# Patient Record
Sex: MALE | Race: BLACK OR AFRICAN AMERICAN | Employment: UNEMPLOYED | URBAN - METROPOLITAN AREA
[De-identification: names, ages, dates, MRNs, and addresses within clinical notes are randomized per-mention and may not be internally consistent; named-entity substitution may affect disease eponyms.]

---

## 2017-01-02 ENCOUNTER — HOSPITAL ENCOUNTER (EMERGENCY)
Facility: CLINIC | Age: 52
Discharge: HOME | End: 2017-01-02
Attending: EMERGENCY MEDICINE | Admitting: EMERGENCY MEDICINE
Payer: COMMERCIAL

## 2017-01-02 VITALS
TEMPERATURE: 98.3 F | BODY MASS INDEX: 28.82 KG/M2 | HEART RATE: 90 BPM | WEIGHT: 156.6 LBS | SYSTOLIC BLOOD PRESSURE: 165 MMHG | RESPIRATION RATE: 16 BRPM | DIASTOLIC BLOOD PRESSURE: 108 MMHG | OXYGEN SATURATION: 98 % | HEIGHT: 62 IN

## 2017-01-02 DIAGNOSIS — R07.81 PLEURITIC CHEST PAIN: ICD-10-CM

## 2017-01-02 PROCEDURE — 93010 ELECTROCARDIOGRAM REPORT: CPT | Mod: Z6 | Performed by: EMERGENCY MEDICINE

## 2017-01-02 PROCEDURE — 25000132 ZZH RX MED GY IP 250 OP 250 PS 637: Performed by: EMERGENCY MEDICINE

## 2017-01-02 PROCEDURE — 99283 EMERGENCY DEPT VISIT LOW MDM: CPT | Mod: 25 | Performed by: EMERGENCY MEDICINE

## 2017-01-02 PROCEDURE — 93005 ELECTROCARDIOGRAM TRACING: CPT

## 2017-01-02 PROCEDURE — 99283 EMERGENCY DEPT VISIT LOW MDM: CPT

## 2017-01-02 RX ORDER — ACETAMINOPHEN 325 MG/1
975 TABLET ORAL ONCE
Status: COMPLETED | OUTPATIENT
Start: 2017-01-02 | End: 2017-01-02

## 2017-01-02 RX ORDER — IBUPROFEN 600 MG/1
600 TABLET, FILM COATED ORAL ONCE
Status: COMPLETED | OUTPATIENT
Start: 2017-01-02 | End: 2017-01-02

## 2017-01-02 RX ORDER — ACETAMINOPHEN 500 MG
1000 TABLET ORAL EVERY 6 HOURS PRN
Qty: 60 TABLET | Refills: 0 | Status: SHIPPED | OUTPATIENT
Start: 2017-01-02 | End: 2017-01-09

## 2017-01-02 RX ORDER — IBUPROFEN 600 MG/1
600 TABLET, FILM COATED ORAL EVERY 8 HOURS PRN
Qty: 20 TABLET | Refills: 0 | Status: ON HOLD | OUTPATIENT
Start: 2017-01-02 | End: 2020-11-24

## 2017-01-02 RX ADMIN — ACETAMINOPHEN 975 MG: 325 TABLET, FILM COATED ORAL at 17:31

## 2017-01-02 RX ADMIN — IBUPROFEN 600 MG: 600 TABLET ORAL at 17:31

## 2017-01-02 ASSESSMENT — ENCOUNTER SYMPTOMS
PALPITATIONS: 0
COUGH: 0
GASTROINTESTINAL NEGATIVE: 1
SHORTNESS OF BREATH: 0
BACK PAIN: 1
WEAKNESS: 1

## 2017-01-02 NOTE — ED PROVIDER NOTES
"  History     Chief Complaint   Patient presents with     Back Pain     Pt c/o upper (L) back pain and feeling weak     HPI  Rony Pandya is a 51 year old male who presents to the ED today for evaluation of left upper back pain and weakness since yesterday. Patient notes his pain is worse with deep inspiration but notes his breathing is otherwise normal. He denies cough. He denies pain or swelling in his lower extremities. He does not smoke. He denies a history of medical problems.    I have reviewed the Medications, Allergies, Past Medical and Surgical History, and Social History in the [facility] system.    PAST MEDICAL HISTORY: History reviewed. No pertinent past medical history.    PAST SURGICAL HISTORY: History reviewed. No pertinent past surgical history.    FAMILY HISTORY: No family history on file.    SOCIAL HISTORY:   Social History   Substance Use Topics     Smoking status: Never Smoker      Smokeless tobacco: Never Used     Alcohol Use: No       No current facility-administered medications for this encounter.     Current Outpatient Prescriptions   Medication     ibuprofen (ADVIL/MOTRIN) 600 MG tablet     acetaminophen (TYLENOL) 500 MG tablet      No Known Allergies      Review of Systems   Respiratory: Negative for cough and shortness of breath.    Cardiovascular: Negative for palpitations and leg swelling.   Gastrointestinal: Negative.    Musculoskeletal: Positive for back pain.   Neurological: Positive for weakness.   All other systems reviewed and are negative.      Physical Exam   BP: (!) 162/97 mmHg  Heart Rate: 102  Temp: 98.5  F (36.9  C)  Resp: 18  Height: 157 cm (5' 1.81\")  Weight: 71.033 kg (156 lb 9.6 oz)  SpO2: 98 %  Physical Exam   Constitutional: He is oriented to person, place, and time. He appears well-developed and well-nourished. No distress.   Cardiovascular: Normal rate, regular rhythm and normal heart sounds.    Pulmonary/Chest: Effort normal and breath sounds normal.   Neurological: He " is alert and oriented to person, place, and time.   Nursing note and vitals reviewed.      ED Course   Procedures       5:17 PM  The patient was seen and examined by Dr. Whitman in Room 17.     Medications   ibuprofen (ADVIL/MOTRIN) tablet 600 mg (600 mg Oral Given 1/2/17 1731)   acetaminophen (TYLENOL) tablet 975 mg (975 mg Oral Given 1/2/17 1731)              Labs Ordered and Resulted from Time of ED Arrival Up to the Time of Departure from the ED - No data to display    Assessments & Plan (with Medical Decision Making)   51-year-old male nonsmoker with acute nontraumatic upper pleuritic back discomfort.  No risk factors for pulmonary embolism no cough, I suspect he has mild pleurisy.  His lungs are clear and is not febrile and not hypoxic.  Recommend symptomatic management only at this time.  He should obtain a primary care provider as he is getting his care in the emergency Department we'll give him the number to the [facility].  I would not do any imaging or diagnostic testing at this time.  I have reviewed the nursing notes.    I have reviewed the findings, diagnosis, plan and need for follow up with the patient.    Discharge Medication List as of 1/2/2017  5:40 PM      START taking these medications    Details   ibuprofen (ADVIL/MOTRIN) 600 MG tablet Take 1 tablet (600 mg) by mouth every 8 hours as needed for moderate pain, Disp-20 tablet, R-0, Local Print      acetaminophen (TYLENOL) 500 MG tablet Take 2 tablets (1,000 mg) by mouth every 6 hours as needed for pain, Disp-60 tablet, R-0, Local Print             Final diagnoses:   Pleuritic chest pain   River BECERRIL , am serving as a trained medical scribe to document services personally performed by Oz Whitman MD, based on the provider's statements to me.   IOz MD, was physically present and have reviewed and verified the accuracy of this note documented by River Brenner.      1/2/2017   [facility], [place], EMERGENCY  [facility]      Oz Whitman MD  01/02/17 6572

## 2017-01-02 NOTE — DISCHARGE INSTRUCTIONS
Use medications as directed  Please make an appointment to follow up with [facility] (phone: [phone]) as soon as possible.

## 2017-01-02 NOTE — ED AVS SNAPSHOT
[facility], [place], Emergency Department    [address]    [facility] [address]    Phone:  [phone]    Fax:  [phone]                                       Rony Pandya   MRN: [id number]    Department:  [facility], Emergency Department   Date of Visit:  1/2/2017           After Visit Summary Signature Page     I have received my discharge instructions, and my questions have been answered. I have discussed any challenges I see with this plan with the nurse or doctor.    ..........................................................................................................................................  Patient/Patient Representative Signature      ..........................................................................................................................................  Patient Representative Print Name and Relationship to Patient    ..................................................               ................................................  Date                                            Time    ..........................................................................................................................................  Reviewed by Signature/Title    ...................................................              ..............................................  Date                                                            Time

## 2017-01-02 NOTE — ED AVS SNAPSHOT
[facility], Emergency Department    [address]    [place] [address]    Phone:  [phone]    Fax:  [phone]                                       Rony Pandya   MRN: [id number]    Department:  [facility], Emergency Department   Date of Visit:  1/2/2017           Patient Information     Date Of Birth          1965        Your diagnoses for this visit were:     Pleuritic chest pain        You were seen by Oz Whitman MD.        Discharge Instructions       Use medications as directed  Please make an appointment to follow up with [facility] (phone: [phone]) as soon as possible.    24 Hour Appointment Hotline       To make an appointment at any [facility], call [phone] ([phone]). If you don't have a family doctor or clinic, we will help you find one. [facility] clinics are conveniently located to serve the needs of you and your family.             Review of your medicines      START taking        Dose / Directions Last dose taken    acetaminophen 500 MG tablet   Commonly known as:  TYLENOL   Dose:  1000 mg   Quantity:  60 tablet        Take 2 tablets (1,000 mg) by mouth every 6 hours as needed for pain   Refills:  0        ibuprofen 600 MG tablet   Commonly known as:  ADVIL/MOTRIN   Dose:  600 mg   Quantity:  20 tablet        Take 1 tablet (600 mg) by mouth every 8 hours as needed for moderate pain   Refills:  0                Prescriptions were sent or printed at these locations (2 Prescriptions)                   Other Prescriptions                Printed at Department/Unit printer (2 of 2)         ibuprofen (ADVIL/MOTRIN) 600 MG tablet               acetaminophen (TYLENOL) 500 MG tablet                Procedures and tests performed during your visit     EKG 12 lead      Orders Needing Specimen Collection     None      Pending Results     No orders found from 1/1/2017 to 1/3/2017.            Thank you for choosing [facility]      "  Thank you for choosing [facility] for your care. Our goal is always to provide you with excellent care. Hearing back from our patients is one way we can continue to improve our services. Please take a few minutes to complete the written survey that you may receive in the mail after you visit with us. Thank you!        [facility] Information     [employer] lets you send messages to your doctor, view your test results, renew your prescriptions, schedule appointments and more. To sign up, go to [email or web address] . Click on \"Log in\" on the left side of the screen, which will take you to the Welcome page. Then click on \"Sign up Now\" on the right side of the page.     You will be asked to enter the access code listed below, as well as some personal information. Please follow the directions to create your username and password.     Your access code is: [email or web address]  Expires: 2017  5:32 PM     Your access code will  in 90 days. If you need help or a new code, please call your [facility] clinic or [phone].        After Visit Summary       This is your record. Keep this with you and show to your community pharmacist(s) and doctor(s) at your next visit.                  "

## 2017-01-03 LAB — INTERPRETATION ECG - MUSE: NORMAL

## 2017-09-18 ENCOUNTER — OFFICE VISIT (OUTPATIENT)
Dept: OPHTHALMOLOGY | Facility: CLINIC | Age: 52
End: 2017-09-18

## 2017-09-18 DIAGNOSIS — H52.4 PRESBYOPIA: ICD-10-CM

## 2017-09-18 DIAGNOSIS — H04.123 DRY EYE SYNDROME OF BILATERAL LACRIMAL GLANDS: Primary | ICD-10-CM

## 2017-09-18 ASSESSMENT — VISUAL ACUITY
OS_SC: J3
OD_SC: 20/20
METHOD: SNELLEN - LINEAR
OD_SC: J3
OS_SC+: +2
OS_SC: 20/25

## 2017-09-18 ASSESSMENT — CUP TO DISC RATIO
OS_RATIO: 0.4
OD_RATIO: 0.4

## 2017-09-18 ASSESSMENT — CONF VISUAL FIELD
OS_NORMAL: 1
OD_NORMAL: 1

## 2017-09-18 ASSESSMENT — EXTERNAL EXAM - LEFT EYE: OS_EXAM: NORMAL

## 2017-09-18 ASSESSMENT — TONOMETRY
OD_IOP_MMHG: 13
OS_IOP_MMHG: 13
IOP_METHOD: APPLANATION

## 2017-09-18 ASSESSMENT — REFRACTION_MANIFEST
OD_CYLINDER: SPHERE
OD_SPHERE: PLANO
OS_ADD: +1.75
OS_SPHERE: +0.25
OS_CYLINDER: SPHERE
OD_ADD: +1.75

## 2017-09-18 ASSESSMENT — SLIT LAMP EXAM - LIDS
COMMENTS: PINGUECULA
COMMENTS: PINGUECULA

## 2017-09-18 ASSESSMENT — EXTERNAL EXAM - RIGHT EYE: OD_EXAM: NORMAL

## 2017-09-18 NOTE — MR AVS SNAPSHOT
After Visit Summary   9/18/2017    Rony Pandya    MRN: [id number]           Patient Information     Date Of Birth          1965        Visit Information        Provider Department      9/18/2017 11:40 AM Alfredo Mohr, [facility]        Today's Diagnoses     Dry eye syndrome of bilateral lacrimal glands - Both Eyes    -  1    Presbyopia - Both Eyes           Follow-ups after your visit        Follow-up notes from your care team     Return in about 1 year (around 9/18/2018) for Comprehensive Exam.      Who to contact     Please call your clinic at [phone] to:    Ask questions about your health    Make or cancel appointments    Discuss your medicines    Learn about your test results    Speak to your doctor   If you have compliments or concerns about an experience at your clinic, or if you wish to file a complaint, please contact [facility] Patient Relations at [phone] or email us at [email or web address]         Additional Information About Your Visit        MyChart Information     [employer] gives you secure access to your electronic health record. If you see a primary care provider, you can also send messages to your care team and make appointments. If you have questions, please call your primary care clinic.  If you do not have a primary care provider, please call [phone] and they will assist you.      [employer] is an electronic gateway that provides easy, online access to your medical records. With [employer], you can request a clinic appointment, read your test results, renew a prescription or communicate with your care team.     To access your existing account, please contact your [facility] or call [phone] for assistance.        Care EveryWhere ID     This is your Care EveryWhere ID. This could be used by other organizations to access your [place] medical records  [id number]          Blood Pressure from Last 3 Encounters:   01/02/17 (!) 165/108   04/10/14 (!) 145/98   10/06/12 149/97    Weight from Last 3 Encounters:   01/02/17 71 kg (156 lb 9.6 oz)   10/06/12 74.4 kg (164 lb)   09/29/12 73.9 kg (163 lb)              We Performed the Following     Refraction        Primary Care Provider Office Phone # Fax #    [facility] [phone] [address] [facility] [address]        Equal Access to Services     [facility] : Hadii lisa reese hadasho Sobeny, waaxda luqadaha, qaybta kaalmada zac, andrew valente . So [facility] [phone].    ATENCIÓN: Si habla español, tiene a qureshi disposición servicios gratuitos de asistencia lingüística. [facility] [phone].    We comply with applicable federal civil rights laws and [place] laws. We do not discriminate on the basis of race, color, national origin, age, disability sex, sexual orientation or gender identity.            Thank you!     Thank you for choosing [facility]  for your care. Our goal is always to provide you with excellent care. Hearing back from our patients is one way we can continue to improve our services. Please take a few minutes to complete the written survey that you may receive in the mail after your visit with us. Thank you!             Your Updated Medication List - Protect others around you: Learn how to safely use, store and throw away your medicines at www.disposemymeds.org.          This list is accurate as of: 9/18/17 12:17 PM.  Always use your most recent med list.                   Brand Name Dispense Instructions for use Diagnosis    ibuprofen 600 MG tablet    ADVIL/MOTRIN    20 tablet    Take 1 tablet (600 mg) by mouth every 8 hours as needed for moderate pain

## 2017-09-18 NOTE — PROGRESS NOTES
Assessment/Plan  (H04.123) Dry eye syndrome of bilateral lacrimal glands - Both Eyes  (primary encounter diagnosis)  Comment: Contributing to redness OU  Plan: Discussed findings with patient, including importance of remembering to blink while working on electronic devices. Recommend regular lubrication with preservative free tears. Recommended patient start with 2-3x daily. Sample of Refresh PFAT's dispensed. Patient should RTC if redness persists or worsens.    (H52.4) Presbyopia - Both Eyes  Comment: Good best corrected vision  Plan: Discussed findings with patient. Rx for reading specs only dispensed. Patient happy with uncorrected vision, so bifocal does not appear indicated at this time. Will monitor annually for changes. Patient should call/RTC with any vision changes.       Complete documentation of historical and exam elements from today's encounter can  be found in the full encounter summary report (not reduplicated in this progress  note). I personally obtained the chief complaint(s) and history of present illness. I  confirmed and edited as necessary the review of systems, past medical/surgical  history, family history, social history, and examination findings as documented by  others; and I examined the patient myself. I personally reviewed the relevant tests,  images, and reports as documented above. I formulated and edited as necessary the  assessment and plan and discussed the findings and management plan with the  patient and family.    Alfredo Mohr OD

## 2018-02-28 ENCOUNTER — NURSE TRIAGE (OUTPATIENT)
Dept: NURSING | Facility: CLINIC | Age: 53
End: 2018-02-28

## 2018-02-28 ENCOUNTER — OFFICE VISIT (OUTPATIENT)
Dept: FAMILY MEDICINE | Facility: CLINIC | Age: 53
End: 2018-02-28

## 2018-02-28 VITALS
BODY MASS INDEX: 27.77 KG/M2 | TEMPERATURE: 98.8 F | SYSTOLIC BLOOD PRESSURE: 132 MMHG | WEIGHT: 150.9 LBS | OXYGEN SATURATION: 98 % | DIASTOLIC BLOOD PRESSURE: 80 MMHG | HEART RATE: 75 BPM

## 2018-02-28 DIAGNOSIS — R05.9 COUGH: Primary | ICD-10-CM

## 2018-02-28 DIAGNOSIS — R09.81 NASAL CONGESTION: ICD-10-CM

## 2018-02-28 LAB
FLUAV+FLUBV AG SPEC QL: NEGATIVE
FLUAV+FLUBV AG SPEC QL: NEGATIVE
SPECIMEN SOURCE: NORMAL

## 2018-02-28 PROCEDURE — 99213 OFFICE O/P EST LOW 20 MIN: CPT | Performed by: NURSE PRACTITIONER

## 2018-02-28 PROCEDURE — 87804 INFLUENZA ASSAY W/OPTIC: CPT | Performed by: NURSE PRACTITIONER

## 2018-02-28 RX ORDER — GUAIFENESIN 600 MG/1
600 TABLET, EXTENDED RELEASE ORAL 2 TIMES DAILY PRN
Qty: 20 TABLET | Refills: 0 | Status: ON HOLD | OUTPATIENT
Start: 2018-02-28 | End: 2020-11-24

## 2018-02-28 NOTE — PROGRESS NOTES
SUBJECTIVE:   Rony Pandya is a 52 year old male who presents to clinic today for the following health issues:    Acute Illness   Acute illness concerns: flu  Onset: Sunday     Fever: YES    Chills/Sweats: YES    Headache (location?): not right now because took aspirin     Sinus Pressure:no    Conjunctivitis:  no    Ear Pain: YES- a little     Rhinorrhea: YES    Congestion: no     Sore Throat: no      Cough: YES    Wheeze: no     Decreased Appetite: YES    Nausea: YES    Vomiting: no     Diarrhea:  no     Dysuria/Freq.: no     Fatigue/Achiness: YES    Sick/Strep Exposure: no      Therapies Tried and outcome: Ibuprofen     Drinking water and well hydrated       Problem list and histories reviewed & adjusted, as indicated.  Additional history: as documented    There is no problem list on file for this patient.    No past surgical history on file.    Social History   Substance Use Topics     Smoking status: Never Smoker     Smokeless tobacco: Never Used     Alcohol use No     No family history on file.      Current Outpatient Prescriptions   Medication Sig Dispense Refill     guaiFENesin (MUCINEX) 600 MG 12 hr tablet Take 1 tablet (600 mg) by mouth 2 times daily as needed for congestion 20 tablet 0     ibuprofen (ADVIL/MOTRIN) 600 MG tablet Take 1 tablet (600 mg) by mouth every 8 hours as needed for moderate pain 20 tablet 0     No Known Allergies  BP Readings from Last 3 Encounters:   02/28/18 132/80   01/02/17 (!) 165/108   04/10/14 (!) 145/98    Wt Readings from Last 3 Encounters:   02/28/18 150 lb 14.4 oz (68.4 kg)   01/02/17 156 lb 9.6 oz (71 kg)   10/06/12 164 lb (74.4 kg)                  Labs reviewed in EPIC    Reviewed and updated as needed this visit by clinical staff       Reviewed and updated as needed this visit by Provider         ROS:  Constitutional, HEENT, cardiovascular, pulmonary, gi and gu systems are negative, except as otherwise noted.    OBJECTIVE:     /80  Pulse 75  Temp 98.8  F  (37.1  C) (Oral)  Wt 150 lb 14.4 oz (68.4 kg)  SpO2 98%  BMI 27.77 kg/m2  Body mass index is 27.77 kg/(m^2).  GENERAL: mildly ill appearing, alert and in no distress  EYES: Eyes grossly normal to inspection, no discharge and PERRL  HENT: Normocephalic, ear canals- normal; TMs- normal ; No sinus tenderness  Nose- normal with clear rhinnorhea; oropharynx clear without erythema or exudates, Mouth- no ulcers, no lesions and mucous membranes moist  NECK: no tenderness, no adenopathy, no asymmetry, no masses, no stiffness  RESP: lungs clear to auscultation - no rales, no rhonchi, no wheezes  CV: regular rates and rhythm, normal S1 S2, no S3 or S4 and no murmur, no click or rub - peripheral pulses normal and brisk cap refill   ABDOMEN: soft, no tenderness, no hepatosplenomegaly, no masses, normal bowel sounds  MS: extremities- no gross deformities noted, no edema  SKIN: no suspicious lesions, no rashes, good skin turgor  NEURO: strength and tone- normal, sensory exam- grossly normal, mentation appears      Diagnostic Test Results:  Results for orders placed or performed in visit on 02/28/18 (from the past 24 hour(s))   Influenza A/B antigen   Result Value Ref Range    Influenza A/B Agn Specimen Nasopharyngeal     Influenza A Negative NEG^Negative    Influenza B Negative NEG^Negative       ASSESSMENT/PLAN:         ICD-10-CM    1. Cough R05 Influenza A/B antigen     guaiFENesin (MUCINEX) 600 MG 12 hr tablet   2. Nasal congestion R09.81 guaiFENesin (MUCINEX) 600 MG 12 hr tablet   Discussed viral versus bacterial illnesses along with typical course of progression, and no signs or symptoms of bacterial infection at this point.    Symptom management: gargle salt water, honey in tea or warm water, plenty of rest and extra fluids. Reviewed signs of dehydration. See patient instructions     See Patient Instructions    CRISTIAN Roth [facility]

## 2018-02-28 NOTE — LETTER
[facility]  [address] [place] [address]  [address]  [facility] [address]  Phone: [phone]  Fax: [phone]    February 28, 2018        Rony Pandya  [address]  [facility] [address]          To whom it may concern:    RE: Rony Pandya    Patient was seen and treated today at our clinic and missed work.    Please contact me for questions or concerns.      Sincerely,        CRISTIAN Roth CNP

## 2018-02-28 NOTE — TELEPHONE ENCOUNTER
"Patient calling reporting he is on day 4 of the \"flu.\" Body aches, fever, cough. Current temp is unknown patient does not have a thermometer.    Per guidelines advised to be seen with in 24 hours. Patient verbalized understanding.    Glenna Casper RN  [facility]      Reason for Disposition    Fever present > 3 days (72 hours)    Additional Information    Negative: Severe difficulty breathing (e.g., struggling for each breath, speaks in single words)    Negative: Bluish lips, tongue, or face now    Negative: Shock suspected (e.g., cold/pale/clammy skin, too weak to stand, low BP, rapid pulse)    Negative: Sounds like a life-threatening emergency to the triager    Negative: Severe sore throat    Negative: [1] Doesn't match the criteria for Influenza AND [2] sounds like a cold    Negative: Influenza vaccine reaction is suspected    Negative: Chest pain  (Exception: MILD central chest pain, present only when coughing)    Negative: [1] Headache AND [2] stiff neck (can't touch chin to chest)    Negative: Fever > 104 F (40 C)    Negative: [1] Difficulty breathing AND [2] not severe AND [3] not from stuffy nose (e.g., not relieved by cleaning out the nose)    Negative: Patient sounds very sick or weak to the triager    Negative: [1] Fever > 101 F (38.3 C) AND [2] age > 60    Negative: [1] Fever > 101 F (38.3 C) AND [2] bedridden (e.g., nursing home patient, CVA, chronic illness, recovering from surgery)    Negative: [1] Fever > 100.5 F (38.1 C) AND [2] diabetes mellitus or weak immune system (e.g., HIV positive, cancer chemo, splenectomy, organ transplant, chronic steroids)    Negative: Patient is HIGH RISK (e.g., age > 64 years, pregnant, HIV+, or chronic medical condition)    Protocols used: INFLUENZA - SEASONAL-ADULT-    "

## 2018-02-28 NOTE — MR AVS SNAPSHOT
After Visit Summary   2/28/2018    Rony Pandya    MRN: [id number]           Patient Information     Date Of Birth          1965        Visit Information        Provider Department      2/28/2018 1:40 PM Arlin Newman APRN [facility]        Today's Diagnoses     Cough    -  1    Nasal congestion          Care Instructions      Preventing Common Respiratory Infections  Respiratory infections such as colds and influenza ( the flu ) are common in winter. These infections are often caused by viruses. They may share some symptoms, but not all respiratory infections are the same. Some make you more sick than others. You can take steps to prevent common respiratory infections. And if you get sick, you can take care of yourself to keep the infection from getting worse.    What is a cold?    Symptoms include runny nose, coughing and sneezing, and sore throat. Cold symptoms tend to be milder than flu symptoms.    Symptoms tend to come on slowly. They last for a few days to about a week.    With a cold, you can still do most of the things you usually do.  What is the flu?    Symptoms include fever, headache, fatigue, cough, sore throat, runny nose, and muscle aches. Children may have upset stomach and vomiting, but adults usually don t.    Symptoms tend to come on quickly. Some, such as fatigue and cough, can last a few weeks.    With the flu, you may feel worn out and not able to do normal activities.    It s most likely NOT the flu if an adult has vomiting or diarrhea for a day or two. This so-called  stomach flu  is probably a GI (gastrointestinal) infection.  When the infection gets worse  Without proper care, a respiratory infection can get worse. It can lead to serious complications and death. If you aren t getting better, call your healthcare provider. Complications can include:    Bronchitis (infection of the airways that leads to shortness of breath and coughing up thick  yellow or green mucus)    Pneumonia (infection of the lungs in which fluid and mucus settle in the lungs, making breathing difficult)    Worsening of chronic conditions such as heart failure, chronic lung disease, asthma, or diabetes    Severe dehydration (loss of fluids)    Sinus problems    Ear infections   Get a flu vaccine  A flu vaccine protects you from influenza (but not other colds or infections). Get a vaccine each fall, before flu season starts. This can be done at a clinic, healthcare provider s office, drugstore, [facility] center, or through your workplace.  Get pneumococcal vaccines  Pneumonia can be a complication of influenza. There are 2 pneumococcal pneumonia vaccines that protect against many types of pneumonia. Talk with your healthcare provider about these important vaccines.   Keep germs from spreading  No one likes getting sick. To protect yourself and others from cold and flu germs:    Wash your hands often. Use alcohol-based hand  when you don t have access to soap and water.    Don t touch your eyes, nose, and mouth. This may help you keep germs out of your body.    Try to avoid people with respiratory infections. You may want to stay out of crowds during flu season (winter).    Ask your healthcare provider if you should get a pneumonia vaccination.  How to wash your hands    Use warm water and plenty of soap. Work up a good lather.    Clean your whole hand, under your nails, between your fingers, and up your wrists. Wash for at least 15 to 20 seconds. Don t just wipe--rub well.    Rinse. Let the water run down your fingertips, not up your wrists.    In a public restroom, use a paper towel to turn off the faucet and open the door.   Date Last Reviewed: 12/1/2016 2000-2017 [employer]. [address] [place] [address], [place] [address]. All rights reserved. This information is not intended as a substitute for professional medical care. Always follow your healthcare  professional's instructions.                Follow-ups after your visit        Who to contact     If you have questions or need follow up information about today's clinic visit or your schedule please contact [facility] directly at [phone].  Normal or non-critical lab and imaging results will be communicated to you by MyChart, letter or phone within 4 business days after the clinic has received the results. If you do not hear from us within 7 days, please contact the clinic through [employer] or phone. If you have a critical or abnormal lab result, we will notify you by phone as soon as possible.  Submit refill requests through [employer] or call your pharmacy and they will forward the refill request to us. Please allow 3 business days for your refill to be completed.          Additional Information About Your Visit        [employer] Information     [employer] gives you secure access to your electronic health record. If you see a primary care provider, you can also send messages to your care team and make appointments. If you have questions, please call your primary care clinic.  If you do not have a primary care provider, please call [phone] and they will assist you.        Care EveryWhere ID     This is your Care EveryWhere ID. This could be used by other organizations to access your [facility] medical records  [id number]        Your Vitals Were     Pulse Temperature Pulse Oximetry BMI (Body Mass Index)          75 98.8  F (37.1  C) (Oral) 98% 27.77 kg/m2         Blood Pressure from Last 3 Encounters:   02/28/18 132/80   01/02/17 (!) 165/108   04/10/14 (!) 145/98    Weight from Last 3 Encounters:   02/28/18 150 lb 14.4 oz (68.4 kg)   01/02/17 156 lb 9.6 oz (71 kg)   10/06/12 164 lb (74.4 kg)              We Performed the Following     Influenza A/B antigen          Today's Medication Changes          These changes are accurate as of 2/28/18  2:05 PM.  If you have any questions, ask your nurse or  doctor.               Start taking these medicines.        Dose/Directions    guaiFENesin 600 MG 12 hr tablet   Commonly known as:  MUCINEX   Used for:  Cough, Nasal congestion   Started by:  Arlin Newman APRN CNP        Dose:  600 mg   Take 1 tablet (600 mg) by mouth 2 times daily as needed for congestion   Quantity:  20 tablet   Refills:  0            Where to get your medicines      These medications were sent to [facility] [place], [place] - [address]  [address] [facility], [facility] [address]     Phone:  [phone]     guaiFENesin 600 MG 12 hr tablet                Primary Care Provider Office Phone # Fax #    [facility] [phone]       [address] [facility] [address]        Equal Access to Services     GRIS FRANCIS : Kath kellyo Sobeny, waaxda luqadaha, qaybta kaalmada adeegyada, andrew gaitan. So [facility] [phone].    ATENCIÓN: Si habla español, tiene a qureshi disposición servicios gratuitos de asistencia lingüística. [facility] [phone].    We comply with applicable federal civil rights laws and [place] laws. We do not discriminate on the basis of race, color, national origin, age, disability, sex, sexual orientation, or gender identity.            Thank you!     Thank you for choosing [facility]  for your care. Our goal is always to provide you with excellent care. Hearing back from our patients is one way we can continue to improve our services. Please take a few minutes to complete the written survey that you may receive in the mail after your visit with us. Thank you!             Your Updated Medication List - Protect others around you: Learn how to safely use, store and throw away your medicines at www.disposemymeds.org.          This list is accurate as of 2/28/18  2:05 PM.  Always use your most recent med list.                   Brand Name Dispense Instructions for use  Diagnosis    guaiFENesin 600 MG 12 hr tablet    MUCINEX    20 tablet    Take 1 tablet (600 mg) by mouth 2 times daily as needed for congestion    Cough, Nasal congestion       ibuprofen 600 MG tablet    ADVIL/MOTRIN    20 tablet    Take 1 tablet (600 mg) by mouth every 8 hours as needed for moderate pain

## 2018-02-28 NOTE — PATIENT INSTRUCTIONS
Preventing Common Respiratory Infections  Respiratory infections such as colds and influenza ( the flu ) are common in winter. These infections are often caused by viruses. They may share some symptoms, but not all respiratory infections are the same. Some make you more sick than others. You can take steps to prevent common respiratory infections. And if you get sick, you can take care of yourself to keep the infection from getting worse.    What is a cold?    Symptoms include runny nose, coughing and sneezing, and sore throat. Cold symptoms tend to be milder than flu symptoms.    Symptoms tend to come on slowly. They last for a few days to about a week.    With a cold, you can still do most of the things you usually do.  What is the flu?    Symptoms include fever, headache, fatigue, cough, sore throat, runny nose, and muscle aches. Children may have upset stomach and vomiting, but adults usually don t.    Symptoms tend to come on quickly. Some, such as fatigue and cough, can last a few weeks.    With the flu, you may feel worn out and not able to do normal activities.    It s most likely NOT the flu if an adult has vomiting or diarrhea for a day or two. This so-called  stomach flu  is probably a GI (gastrointestinal) infection.  When the infection gets worse  Without proper care, a respiratory infection can get worse. It can lead to serious complications and death. If you aren t getting better, call your healthcare provider. Complications can include:    Bronchitis (infection of the airways that leads to shortness of breath and coughing up thick yellow or green mucus)    Pneumonia (infection of the lungs in which fluid and mucus settle in the lungs, making breathing difficult)    Worsening of chronic conditions such as heart failure, chronic lung disease, asthma, or diabetes    Severe dehydration (loss of fluids)    Sinus problems    Ear infections   Get a flu vaccine  A flu vaccine protects you from influenza  (but not other colds or infections). Get a vaccine each fall, before flu season starts. This can be done at a clinic, healthcare provider s office, drugstore, senior center, or through your workplace.  Get pneumococcal vaccines  Pneumonia can be a complication of influenza. There are 2 pneumococcal pneumonia vaccines that protect against many types of pneumonia. Talk with your healthcare provider about these important vaccines.   Keep germs from spreading  No one likes getting sick. To protect yourself and others from cold and flu germs:    Wash your hands often. Use alcohol-based hand  when you don t have access to soap and water.    Don t touch your eyes, nose, and mouth. This may help you keep germs out of your body.    Try to avoid people with respiratory infections. You may want to stay out of crowds during flu season (winter).    Ask your healthcare provider if you should get a pneumonia vaccination.  How to wash your hands    Use warm water and plenty of soap. Work up a good lather.    Clean your whole hand, under your nails, between your fingers, and up your wrists. Wash for at least 15 to 20 seconds. Don t just wipe--rub well.    Rinse. Let the water run down your fingertips, not up your wrists.    In a public restroom, use a paper towel to turn off the faucet and open the door.   Date Last Reviewed: 12/1/2016 2000-2017 [employer]. [address] [facility], [place] [address]. All rights reserved. This information is not intended as a substitute for professional medical care. Always follow your healthcare professional's instructions.

## 2019-12-16 NOTE — PROGRESS NOTES
HPI:  Patient complains of mild redness when he is on the computer. He is on the computer 8 hours per day.       Pertinent Medical History:    Hypertension    Ocular History:    Dry Eye Syndrome both eyes.     Presbyopia both eyes.     Eye Medications:    None    Assessment and Plan:  1.   Presbyopia both eyes.     NVO. Polycarbonate    2.   Dry Eye Syndrome both eyes.     Start preservative free articial tears QID both eyes.     3.   Macular Lesion, right eye.     Differential diagnosis includes drusenoid pigmentary epithelial detachment, dry AMD, or pattern dystrophy.      OCT-A shows lesion in the choroid.     Macular lesion was not noted at the last exam with Dr. Mohr 09/18/17.     See retina for consult. Discovered on routine exam. Vision is 20/20 in both eyes.     4.   Cataract, both eyes.     Not visually significant. Monitor.       Medical History:  No past medical history on file.    Medications:  Current Outpatient Medications   Medication Sig Dispense Refill     guaiFENesin (MUCINEX) 600 MG 12 hr tablet Take 1 tablet (600 mg) by mouth 2 times daily as needed for congestion 20 tablet 0     ibuprofen (ADVIL/MOTRIN) 600 MG tablet Take 1 tablet (600 mg) by mouth every 8 hours as needed for moderate pain 20 tablet 0   Complete documentation of historical and exam elements from today's encounter can be found in the full encounter summary report (not reduplicated in this progress note). I personally obtained the chief complaint(s) and history of present illness.  I confirmed and edited as necessary the review of systems, past medical/surgical history, family history, social history, and examination findings as documented by others; and I examined the patient myself. I personally reviewed the relevant tests, images, and reports as documented above. I formulated and edited as necessary the assessment and plan and discussed the findings and management plan with the patient and family. - Kelvin Arellano OD

## 2019-12-18 ENCOUNTER — OFFICE VISIT (OUTPATIENT)
Dept: OPHTHALMOLOGY | Facility: CLINIC | Age: 54
End: 2019-12-18
Attending: OPTOMETRIST
Payer: COMMERCIAL

## 2019-12-18 DIAGNOSIS — H04.123 DRY EYE SYNDROME OF BOTH EYES: Primary | ICD-10-CM

## 2019-12-18 DIAGNOSIS — H35.721 RETINAL PIGMENT EPITHELIAL DETACHMENT OF RIGHT EYE: ICD-10-CM

## 2019-12-18 DIAGNOSIS — H52.4 PRESBYOPIA OF BOTH EYES: ICD-10-CM

## 2019-12-18 DIAGNOSIS — H25.13 NUCLEAR SENILE CATARACT OF BOTH EYES: ICD-10-CM

## 2019-12-18 PROCEDURE — 92250 FUNDUS PHOTOGRAPHY W/I&R: CPT | Mod: ZF | Performed by: OPTOMETRIST

## 2019-12-18 PROCEDURE — 92015 DETERMINE REFRACTIVE STATE: CPT | Mod: ZF

## 2019-12-18 PROCEDURE — G0463 HOSPITAL OUTPT CLINIC VISIT: HCPCS | Mod: ZF

## 2019-12-18 PROCEDURE — 92134 CPTRZ OPH DX IMG PST SGM RTA: CPT | Mod: ZF | Performed by: OPTOMETRIST

## 2019-12-18 RX ORDER — CARBOXYMETHYLCELLULOSE SODIUM 5 MG/ML
1 SOLUTION/ DROPS OPHTHALMIC 4 TIMES DAILY
Qty: 1 ML | Refills: 11 | Status: ON HOLD | OUTPATIENT
Start: 2019-12-18 | End: 2020-11-24

## 2019-12-18 ASSESSMENT — REFRACTION_MANIFEST
OS_AXIS: 065
OS_ADD: +2.00
OS_SPHERE: PLANO
OD_CYLINDER: +0.25
OS_CYLINDER: +0.50
OD_AXIS: 160
OD_SPHERE: PLANO
OD_ADD: +2.00

## 2019-12-18 ASSESSMENT — VISUAL ACUITY
METHOD: SNELLEN - LINEAR
OD_SC: 20/20
OS_SC: 20/20

## 2019-12-18 ASSESSMENT — TONOMETRY
OS_IOP_MMHG: 12
IOP_METHOD: TONOPEN
OD_IOP_MMHG: 08

## 2019-12-18 ASSESSMENT — CUP TO DISC RATIO
OD_RATIO: 0.4
OS_RATIO: 0.4

## 2019-12-18 ASSESSMENT — CONF VISUAL FIELD
OS_NORMAL: 1
OD_NORMAL: 1
METHOD: COUNTING FINGERS

## 2019-12-18 ASSESSMENT — EXTERNAL EXAM - RIGHT EYE: OD_EXAM: NORMAL

## 2019-12-18 ASSESSMENT — SLIT LAMP EXAM - LIDS
COMMENTS: NORMAL
COMMENTS: NORMAL

## 2019-12-18 ASSESSMENT — EXTERNAL EXAM - LEFT EYE: OS_EXAM: NORMAL

## 2019-12-18 NOTE — NURSING NOTE
Chief Complaints and History of Present Illnesses   Patient presents with     Yearly Exam     Chief Complaint(s) and History of Present Illness(es)     Yearly Exam     Laterality: both eyes    Onset: gradual    Onset: years ago    Quality: Feels that the va has changed at near    Frequency: constantly    Associated symptoms: Negative for dryness, redness and tearing    Pain scale: 0/10              Comments     Zakiya KAPOOR 10:04 AM December 18, 2019

## 2020-01-15 ENCOUNTER — TELEPHONE (OUTPATIENT)
Dept: OPHTHALMOLOGY | Facility: CLINIC | Age: 55
End: 2020-01-15

## 2020-01-15 NOTE — TELEPHONE ENCOUNTER
----- Message from Kelvin Arellano OD sent at 1/15/2020  2:55 PM CST -----  Drusenoid PED to see dr. villeda

## 2020-02-26 ENCOUNTER — TELEPHONE (OUTPATIENT)
Dept: OPHTHALMOLOGY | Facility: CLINIC | Age: 55
End: 2020-02-26

## 2020-02-26 NOTE — TELEPHONE ENCOUNTER
----- Message from Kelvin Arellano OD sent at 2/26/2020  1:53 PM CST -----  Drusenoid ped to follow up with dr. Cervantes.Thanks

## 2020-03-02 ENCOUNTER — HEALTH MAINTENANCE LETTER (OUTPATIENT)
Age: 55
End: 2020-03-02

## 2020-05-22 ENCOUNTER — VIRTUAL VISIT (OUTPATIENT)
Dept: FAMILY MEDICINE | Facility: CLINIC | Age: 55
End: 2020-05-22
Payer: COMMERCIAL

## 2020-05-22 DIAGNOSIS — Z53.9 NO SHOW: Primary | ICD-10-CM

## 2020-05-22 NOTE — PROGRESS NOTES
"Rony Pandya is a 54 year old male who is being evaluated via a billable video visit.      The patient has been notified of following:     \"This video visit will be conducted via a call between you and your physician/provider. We have found that certain health care needs can be provided without the need for an in-person physical exam.  This service lets us provide the care you need with a video conversation.  If a prescription is necessary we can send it directly to your pharmacy.  If lab work is needed we can place an order for that and you can then stop by our lab to have the test done at a later time.    Video visits are billed at different rates depending on your insurance coverage.  Please reach out to your insurance provider with any questions.    If during the course of the call the physician/provider feels a video visit is not appropriate, you will not be charged for this service.\"    Patient has given verbal consent for Video visit? Yes    How would you like to obtain your AVS? Donnell    Patient would like the video invitation sent by: Send to e-mail at: [email or web address]    Will anyone else be joining your video visit? Mom has appointment @ 9:40am with you, so they will be together.      Provider unable to reach by email or phone attempted x 3  This patient was a no show for this scheduled appointment.  "

## 2020-06-30 ENCOUNTER — HOSPITAL ENCOUNTER (EMERGENCY)
Facility: CLINIC | Age: 55
Discharge: HOME | End: 2020-06-30
Attending: EMERGENCY MEDICINE | Admitting: EMERGENCY MEDICINE
Payer: COMMERCIAL

## 2020-06-30 VITALS
RESPIRATION RATE: 18 BRPM | OXYGEN SATURATION: 99 % | TEMPERATURE: 98.6 F | HEART RATE: 83 BPM | SYSTOLIC BLOOD PRESSURE: 157 MMHG | DIASTOLIC BLOOD PRESSURE: 97 MMHG

## 2020-06-30 DIAGNOSIS — R10.13 ABDOMINAL PAIN, EPIGASTRIC: ICD-10-CM

## 2020-06-30 DIAGNOSIS — E87.1 HYPONATREMIA: ICD-10-CM

## 2020-06-30 DIAGNOSIS — R11.0 NAUSEA: ICD-10-CM

## 2020-06-30 LAB
ALBUMIN SERPL-MCNC: 3.7 G/DL (ref 3.4–5)
ALP SERPL-CCNC: 93 U/L (ref 40–150)
ALT SERPL W P-5'-P-CCNC: 16 U/L (ref 0–70)
ANION GAP SERPL CALCULATED.3IONS-SCNC: 9 MMOL/L (ref 3–14)
AST SERPL W P-5'-P-CCNC: 20 U/L (ref 0–45)
BASOPHILS # BLD AUTO: 0.1 10E9/L (ref 0–0.2)
BASOPHILS NFR BLD AUTO: 0.7 %
BILIRUB SERPL-MCNC: 0.8 MG/DL (ref 0.2–1.3)
BUN SERPL-MCNC: 3 MG/DL (ref 7–30)
CALCIUM SERPL-MCNC: 8.6 MG/DL (ref 8.5–10.1)
CHLORIDE SERPL-SCNC: 89 MMOL/L (ref 94–109)
CO2 SERPL-SCNC: 25 MMOL/L (ref 20–32)
CREAT SERPL-MCNC: 0.59 MG/DL (ref 0.66–1.25)
DIFFERENTIAL METHOD BLD: ABNORMAL
EOSINOPHIL # BLD AUTO: 0 10E9/L (ref 0–0.7)
EOSINOPHIL NFR BLD AUTO: 0.4 %
ERYTHROCYTE [DISTWIDTH] IN BLOOD BY AUTOMATED COUNT: 11.5 % (ref 10–15)
GFR SERPL CREATININE-BSD FRML MDRD: >90 ML/MIN/{1.73_M2}
GLUCOSE SERPL-MCNC: 109 MG/DL (ref 70–99)
HCT VFR BLD AUTO: 34.7 % (ref 40–53)
HGB BLD-MCNC: 12.2 G/DL (ref 13.3–17.7)
IMM GRANULOCYTES # BLD: 0 10E9/L (ref 0–0.4)
IMM GRANULOCYTES NFR BLD: 0.3 %
LIPASE SERPL-CCNC: 49 U/L (ref 73–393)
LYMPHOCYTES # BLD AUTO: 1 10E9/L (ref 0.8–5.3)
LYMPHOCYTES NFR BLD AUTO: 14.4 %
MCH RBC QN AUTO: 33.9 PG (ref 26.5–33)
MCHC RBC AUTO-ENTMCNC: 35.2 G/DL (ref 31.5–36.5)
MCV RBC AUTO: 96 FL (ref 78–100)
MONOCYTES # BLD AUTO: 0.6 10E9/L (ref 0–1.3)
MONOCYTES NFR BLD AUTO: 8.2 %
NEUTROPHILS # BLD AUTO: 5.1 10E9/L (ref 1.6–8.3)
NEUTROPHILS NFR BLD AUTO: 76 %
NRBC # BLD AUTO: 0 10*3/UL
NRBC BLD AUTO-RTO: 0 /100
PLATELET # BLD AUTO: 223 10E9/L (ref 150–450)
POTASSIUM SERPL-SCNC: 3.9 MMOL/L (ref 3.4–5.3)
PROT SERPL-MCNC: 7.5 G/DL (ref 6.8–8.8)
RBC # BLD AUTO: 3.6 10E12/L (ref 4.4–5.9)
SODIUM SERPL-SCNC: 123 MMOL/L (ref 133–144)
WBC # BLD AUTO: 6.7 10E9/L (ref 4–11)

## 2020-06-30 PROCEDURE — 99284 EMERGENCY DEPT VISIT MOD MDM: CPT | Mod: 25 | Performed by: EMERGENCY MEDICINE

## 2020-06-30 PROCEDURE — 83690 ASSAY OF LIPASE: CPT | Performed by: EMERGENCY MEDICINE

## 2020-06-30 PROCEDURE — 96375 TX/PRO/DX INJ NEW DRUG ADDON: CPT | Performed by: EMERGENCY MEDICINE

## 2020-06-30 PROCEDURE — 99284 EMERGENCY DEPT VISIT MOD MDM: CPT | Mod: Z6 | Performed by: EMERGENCY MEDICINE

## 2020-06-30 PROCEDURE — 85025 COMPLETE CBC W/AUTO DIFF WBC: CPT | Performed by: EMERGENCY MEDICINE

## 2020-06-30 PROCEDURE — 25000128 H RX IP 250 OP 636: Performed by: EMERGENCY MEDICINE

## 2020-06-30 PROCEDURE — 96361 HYDRATE IV INFUSION ADD-ON: CPT | Performed by: EMERGENCY MEDICINE

## 2020-06-30 PROCEDURE — 96374 THER/PROPH/DIAG INJ IV PUSH: CPT | Performed by: EMERGENCY MEDICINE

## 2020-06-30 PROCEDURE — C9113 INJ PANTOPRAZOLE SODIUM, VIA: HCPCS | Performed by: EMERGENCY MEDICINE

## 2020-06-30 PROCEDURE — 25800030 ZZH RX IP 258 OP 636: Performed by: EMERGENCY MEDICINE

## 2020-06-30 PROCEDURE — 80053 COMPREHEN METABOLIC PANEL: CPT | Performed by: EMERGENCY MEDICINE

## 2020-06-30 PROCEDURE — 25000132 ZZH RX MED GY IP 250 OP 250 PS 637: Performed by: EMERGENCY MEDICINE

## 2020-06-30 PROCEDURE — 25000125 ZZHC RX 250: Performed by: EMERGENCY MEDICINE

## 2020-06-30 RX ORDER — ONDANSETRON 4 MG/1
4 TABLET, ORALLY DISINTEGRATING ORAL EVERY 8 HOURS PRN
Qty: 9 TABLET | Refills: 0 | Status: SHIPPED | OUTPATIENT
Start: 2020-06-30 | End: 2020-07-15

## 2020-06-30 RX ORDER — ONDANSETRON 2 MG/ML
4 INJECTION INTRAMUSCULAR; INTRAVENOUS ONCE
Status: COMPLETED | OUTPATIENT
Start: 2020-06-30 | End: 2020-06-30

## 2020-06-30 RX ADMIN — PANTOPRAZOLE SODIUM 40 MG: 40 INJECTION, POWDER, FOR SOLUTION INTRAVENOUS at 04:03

## 2020-06-30 RX ADMIN — SODIUM CHLORIDE 1000 ML: 9 INJECTION, SOLUTION INTRAVENOUS at 02:19

## 2020-06-30 RX ADMIN — LIDOCAINE HYDROCHLORIDE 30 ML: 20 SOLUTION ORAL; TOPICAL at 01:17

## 2020-06-30 RX ADMIN — ONDANSETRON 4 MG: 2 INJECTION INTRAMUSCULAR; INTRAVENOUS at 03:32

## 2020-06-30 NOTE — ED AVS SNAPSHOT
[facility], [place], Emergency Department  [address]  [facility] [address]  Phone:  [phone]  Fax:  [phone]                                    Rony Pandya   MRN: [id number]    Department:  [facility], Emergency Department   Date of Visit:  6/30/2020           After Visit Summary Signature Page    I have received my discharge instructions, and my questions have been answered. I have discussed any challenges I see with this plan with the nurse or doctor.    ..........................................................................................................................................  Patient/Patient Representative Signature      ..........................................................................................................................................  Patient Representative Print Name and Relationship to Patient    ..................................................               ................................................  Date                                   Time    ..........................................................................................................................................  Reviewed by Signature/Title    ...................................................              ..............................................  Date                                               Time          22EPIC Rev 08/18

## 2020-06-30 NOTE — ED PROVIDER NOTES
ED Provider Note  [facility]      History   No chief complaint on file.    HPI  Rony Pandya is a 54 year old male who no significant past medical history who presents the emergency department from home by EMS with a complaint of abdominal pain.  Patient complains of abdominal pain that has been intermittent throughout the day.  Patient describes the pain as a burning sensation in his epigastric region.  Patient denies any radiation of the pain, no aggravating, no alleviating factors.  Patient denies any fever, chills, chest pain, shortness of breath.  Patient reports some nausea but denies any vomiting, diarrhea, dysuria, no other complaints.  Patient states that he was fasting all day and was unable to eat or drink due to the nausea and the pain.  Patient denies any tobacco, drug, alcohol use.  Patient states that he did try a small amount of charcoal to see if that would help with the nausea.  Patient does report some improvement after receiving Zofran and Tums in route by EMS.      Past Medical History  History reviewed. No pertinent past medical history.  History reviewed. No pertinent surgical history.  carboxymethylcellulose PF (CARBOXYMETHYLCELLULOSE SODIUM) 0.5 % ophthalmic solution  guaiFENesin (MUCINEX) 600 MG 12 hr tablet  ibuprofen (ADVIL/MOTRIN) 600 MG tablet      No Known Allergies  Past medical history, past surgical history, medications, and allergies were reviewed with the patient. Additional pertinent items: None    Family History  History reviewed. No pertinent family history.  Family history was reviewed with the patient. Additional pertinent items: None    Social History  Social History     Tobacco Use     Smoking status: Never Smoker     Smokeless tobacco: Never Used   Substance Use Topics     Alcohol use: No     Drug use: No      Social history was reviewed with the patient. Additional pertinent items: None    Review of Systems  A complete review of systems was  performed with pertinent positives and negatives noted in the HPI, and all other systems negative.    Physical Exam   BP: (!) 157/95  Pulse: 92  Temp: 97.7  F (36.5  C)  Resp: 16  SpO2: 100 %  Physical Exam  General: Afebrile, no acute distress   HEENT: Normocephalic, atraumatic, conjunctivae normal. MMM  Neck: non-tender, supple  Cardio: regular rate.   Resp: Normal work of breathing, no respiratory distress  Chest/Back: no visual signs of trauma  Abdomen: soft, no tenderness, no peritoneal signs  Neuro: alert and fully oriented. CN II-XII grossly intact. Grossly normal strength and sensation in all extremities.   MSK: no deformities. Normal range of motion  Integumentary/Skin: no rash visualized, normal color  Psych: normal affect, normal behavior    ED Course      Procedures       Results for orders placed or performed during the hospital encounter of 06/30/20   CBC with platelets differential     Status: Abnormal   Result Value Ref Range    WBC 6.7 4.0 - 11.0 10e9/L    RBC Count 3.60 (L) 4.4 - 5.9 10e12/L    Hemoglobin 12.2 (L) 13.3 - 17.7 g/dL    Hematocrit 34.7 (L) 40.0 - 53.0 %    MCV 96 78 - 100 fl    MCH 33.9 (H) 26.5 - 33.0 pg    MCHC 35.2 31.5 - 36.5 g/dL    RDW 11.5 10.0 - 15.0 %    Platelet Count 223 150 - 450 10e9/L    Diff Method Automated Method     % Neutrophils 76.0 %    % Lymphocytes 14.4 %    % Monocytes 8.2 %    % Eosinophils 0.4 %    % Basophils 0.7 %    % Immature Granulocytes 0.3 %    Nucleated RBCs 0 0 /100    Absolute Neutrophil 5.1 1.6 - 8.3 10e9/L    Absolute Lymphocytes 1.0 0.8 - 5.3 10e9/L    Absolute Monocytes 0.6 0.0 - 1.3 10e9/L    Absolute Eosinophils 0.0 0.0 - 0.7 10e9/L    Absolute Basophils 0.1 0.0 - 0.2 10e9/L    Abs Immature Granulocytes 0.0 0 - 0.4 10e9/L    Absolute Nucleated RBC 0.0    Comprehensive metabolic panel     Status: Abnormal   Result Value Ref Range    Sodium 123 (L) 133 - 144 mmol/L    Potassium 3.9 3.4 - 5.3 mmol/L    Chloride 89 (L) 94 - 109 mmol/L    Carbon  Dioxide 25 20 - 32 mmol/L    Anion Gap 9 3 - 14 mmol/L    Glucose 109 (H) 70 - 99 mg/dL    Urea Nitrogen 3 (L) 7 - 30 mg/dL    Creatinine 0.59 (L) 0.66 - 1.25 mg/dL    GFR Estimate >90 >60 mL/min/[1.73_m2]    GFR Estimate If Black >90 >60 mL/min/[1.73_m2]    Calcium 8.6 8.5 - 10.1 mg/dL    Bilirubin Total 0.8 0.2 - 1.3 mg/dL    Albumin 3.7 3.4 - 5.0 g/dL    Protein Total 7.5 6.8 - 8.8 g/dL    Alkaline Phosphatase 93 40 - 150 U/L    ALT 16 0 - 70 U/L    AST 20 0 - 45 U/L   Lipase     Status: Abnormal   Result Value Ref Range    Lipase 49 (L) 73 - 393 U/L     Medications   0.9% sodium chloride BOLUS (1,000 mLs Intravenous New Bag 6/30/20 0219)   lidocaine (XYLOCAINE) 2 % 15 mL, alum & mag hydroxide-simethicone (MAALOX  ES) 15 mL GI Cocktail (30 mLs Oral Given 6/30/20 0117)        Assessments & Plan (with Medical Decision Making)   Rony Pandya is a 54 year old male who no significant past medical history who presents the emergency department from home by EMS with a complaint of abdominal pain.  Upon arrival patient is well-appearing, afebrile, no distress.  Abdomen is soft, nontender, nondistended, no peritoneal signs.  Differential diagnosis includes but is not limited to gastritis versus acid reflux versus peptic ulcer disease versus gastroenteritis versus pancreatitis versus cholecystitis versus biliary colic versus less likely obstruction among others.  Patient reports improvement of symptoms after Zofran and Tums in route by EMS.    Patient reports complete resolution of his symptoms after GI cocktail and patient continues to rest comfortably.  I reviewed comprehensive labs which are remarkable for white blood cell count of 6.7, hemoglobin 12.2, hyponatremia with a sodium of 123 and a chloride of 89.  No transaminitis.  Patient given 1 L normal saline IV fluid bolus in the emergency department along with saltine crackers.  At this time patient is feeling better on like to go home.  Patient is asymptomatic,  unclear of duration of hyponatremia however could be related to his fasting.  Patient also discloses that he took something last week to detox and since that time he has been losing everything from his body mostly in the form of diarrhea.  At this time would recommend supportive care at home with increase salt intake via diet along with electrolyte hydration.  Recommend following up with a primary care provider and repeat sodium check in the next 4 to 6 days.  Recommend repeat laboratory testing on Friday.  Patient is agreeable to this plan.  Return precautions discussed if high fever, severe pain, nausea, vomiting, weakness, syncope, confusion, new or worsening symptoms.  Patient understands and agrees with the plan.      I have reviewed the nursing notes. I have reviewed the findings, diagnosis, plan and need for follow up with the patient.    New Prescriptions    No medications on file       Final diagnoses:   Abdominal pain, epigastric   Nausea   Hyponatremia       --  Adrianna Garner MD   Emergency Medicine   [facility], EMERGENCY DEPARTMENT  6/30/2020         Adrianna Garner MD  06/30/20 0357

## 2020-06-30 NOTE — DISCHARGE INSTRUCTIONS
Thank you for your patience today.  Please follow-up with your regular doctor or in one of our clinics in the next 2-3 days for further evaluation and follow-up care.  Please call this morning to schedule an appointment.  [facility] [phone] or [facility] [phone].     Your sodium level was very low today. It is extremely important that you follow up and have repeat laboratory testing (recheck your sodium level) in the next 4-6 days. We recommend a recheck on Friday July 3rd.  Please increase your salt intake in your diet.  Please drink fluids with electrolytes (Gatorade, Powerade, propel). Please continue your own medications.  Please return to the ER if you develop high fever, nausea, vomiting, weakness, confusion, headaches, muscle cramping, or any worsening of your current symptoms.  It was a pleasure taking care of you today.  We hope you feel better soon.

## 2020-06-30 NOTE — ED TRIAGE NOTES
Pt has been fasting all day and has been drinking apple juice in between fast. He started having abdominal burning on and off all day. Got worst tonight. EMS give him 4 mg Zofran and Tums - didn't help. Presently, it continues to burn and uncomfortable.

## 2020-06-30 NOTE — ED NOTES
Bed: ED04  Expected date: 6/30/20  Expected time: 12:05 AM  Means of arrival: Ambulance  Comments:  [phone]  54 M  ABD pain

## 2020-07-08 ENCOUNTER — VIRTUAL VISIT (OUTPATIENT)
Dept: FAMILY MEDICINE | Facility: CLINIC | Age: 55
End: 2020-07-08
Payer: COMMERCIAL

## 2020-07-08 DIAGNOSIS — E63.9 NUTRITIONAL DEFICIENCY: ICD-10-CM

## 2020-07-08 DIAGNOSIS — Z13.220 LIPID SCREENING: ICD-10-CM

## 2020-07-08 DIAGNOSIS — D64.9 ANEMIA, UNSPECIFIED TYPE: ICD-10-CM

## 2020-07-08 DIAGNOSIS — E55.9 VITAMIN D DEFICIENCY: ICD-10-CM

## 2020-07-08 DIAGNOSIS — R10.84 ABDOMINAL PAIN, GENERALIZED: ICD-10-CM

## 2020-07-08 DIAGNOSIS — B86 SCABIES INFESTATION: Primary | ICD-10-CM

## 2020-07-08 PROCEDURE — 99214 OFFICE O/P EST MOD 30 MIN: CPT | Mod: 95 | Performed by: NURSE PRACTITIONER

## 2020-07-08 RX ORDER — PERMETHRIN 50 MG/G
CREAM TOPICAL
Qty: 60 G | Refills: 3 | Status: SHIPPED | OUTPATIENT
Start: 2020-07-08 | End: 2020-07-15

## 2020-07-08 NOTE — PATIENT INSTRUCTIONS
Patient Education     Scabies    Scabies is an infection caused by very tiny mites that burrow into the skin. The mites are called Sarcoptes scabiei. They cause severe itching. Though children are most commonly infected, anyone can get scabies. Scabies mites can pass from person to person through close physical contact. They can also be passed through shared clothing, towels, and bedding. Scabies infection is not usually dangerous, but it is uncomfortable. Because it is so contagious, scabies should be treated immediately to keep the infection from spreading.  Symptoms  Symptoms of scabies appear about 2 to 6 weeks after infection in a child or adult who has never had scabies before. A child or adult who has been infected before will experience symptoms much sooner, in 1 to 4 days. Signs of scabies infection may include:    Intense itching, especially at night or after a hot bath    Skin irritations that look like hives, insect bites, pimples, or blisters, especially on warmer areas of the body (such as between the fingers, in the armpits, and in the creases of the wrists, elbows, and knees)    Sores on the body caused by scratching (the sores may become infected)    [place] created by mites traveling under the skin, which look like lines on the skin s surface  Treating scabies infection  Scabies infections are usually treated with a prescription lotion that kills the mites. The lotion must be applied to the entire body from the neck down. This includes the palms of the hands, soles of the feet, groin, and under the fingernails. The lotion must be left on for 8 to 14 hours. The lotion is usually applied at night before bed and then washed off the next morning. In some cases, a second application of lotion is needed a week after the first. Medicines work quickly, but most children and adults continue to have an itchy rash for several weeks after treatment. Marks on the skin from scabies usually go away in 1 to 2  weeks, but sometimes take a few months to clear. If the topical creams are not effective, an oral medication may be prescribed.  Preventing spread of the infection  To prevent reinfection and the spread of scabies to others, follow these instructions:    Wash the infected person s clothing, towels, bed linens, cloth toys, and other personal items in very hot, soapy water Dry them thoroughly in a dryer set on the hot cycle. If an item cannot be laundered, have the item dry cleaned. Do not share among family members.     Seal items that can t be washed in plastic bags for at least 3 days and possibly up to 1 week.    Vacuum floors and furniture. Throw the vacuum bag away afterward.    Notify an infected child s school and caregivers so that other children can be checked and treated.    Keep an infected child home from  or school until the morning after treatment for scabies.    Warn children not to share items such as clothing and towels with other children.    Be aware that all household members who may have been exposed to scabies may need to be treated, whether they show symptoms or not. Talk with your healthcare provider.    Do not spray your house with chemicals or pesticides. These can be dangerous to your family s health.   When to call your healthcare provider    The infected person has a fever (00.4 degrees F (38degrees C) or higher, or as directed by your provider), red streaks, pain, or swelling of the skin.    Yellow brown crusts or drainage from the sores    Sores get worse or do not heal.    New rashes appear or itching continues for more than 2 weeks after treatment.    Date Last Reviewed: 6/1/2016 2000-2019 [employer]. [address] [place] [address], [place] [address]. All rights reserved. This information is not intended as a substitute for professional medical care. Always follow your healthcare professional's instructions.

## 2020-07-08 NOTE — PROGRESS NOTES
"Rony Pandya is a 54 year old male who is being evaluated via a billable telephone visit.      The patient has been notified of following:     \"This telephone visit will be conducted via a call between you and your physician/provider. We have found that certain health care needs can be provided without the need for a physical exam.  This service lets us provide the care you need with a short phone conversation.  If a prescription is necessary we can send it directly to your pharmacy.  If lab work is needed we can place an order for that and you can then stop by our lab to have the test done at a later time.    Telephone visits are billed at different rates depending on your insurance coverage. During this emergency period, for some insurers they may be billed the same as an in-person visit.  Please reach out to your insurance provider with any questions.    If during the course of the call the physician/provider feels a telephone visit is not appropriate, you will not be charged for this service.\"    Patient has given verbal consent for Telephone visit?  Yes    What phone number would you like to be contacted at? [phone]    How would you like to obtain your AVS? Mail a copy    Subjective     Rony Pandya is a 54 year old male who presents via phone visit today for the following health issues:    HPI  ED/UC Followup:    [facility]ility[facility]  Date of visit: 6/30/2020  Reason for visit: abdominal pain   Current Status: patient said he is doing ok now not having any abdominal pain or nausea      Has happened in the past and admits nutrition not the best          Rash  Onset:     Description:   Location: hands and legs   Character: raised   Itching (Pruritis): YES- severe     Progression of Symptoms:  same    Accompanying Signs & Symptoms:  Fever: no   Body aches or joint pain: no   Sore throat symptoms: no   Recent cold symptoms: no     History:   Previous similar rash: his mom lives with him and she has " scabies    Precipitating factors:   Exposure to similar rash: YES- wife has it and his mom has scabies that lives with him  New exposures: None   Recent travel: no     Alleviating factors:  none    Therapies Tried and outcome: none     There is no problem list on file for this patient.    History reviewed. No pertinent surgical history.    Social History     Tobacco Use     Smoking status: Never Smoker     Smokeless tobacco: Never Used   Substance Use Topics     Alcohol use: No     History reviewed. No pertinent family history.      Current Outpatient Medications   Medication Sig Dispense Refill     permethrin (ELIMITE) 5 % external cream Apply cream from head to toe (except the face); leave on for 8-14 hours then wash off with water; reapply in 1 week if live mites appear. 60 g 3     carboxymethylcellulose PF (CARBOXYMETHYLCELLULOSE SODIUM) 0.5 % ophthalmic solution Place 1 drop into both eyes 4 times daily (Patient not taking: Reported on 7/8/2020) 1 mL 11     guaiFENesin (MUCINEX) 600 MG 12 hr tablet Take 1 tablet (600 mg) by mouth 2 times daily as needed for congestion (Patient not taking: Reported on 7/8/2020) 20 tablet 0     ibuprofen (ADVIL/MOTRIN) 600 MG tablet Take 1 tablet (600 mg) by mouth every 8 hours as needed for moderate pain (Patient not taking: Reported on 7/8/2020) 20 tablet 0     No Known Allergies  Recent Labs   Lab Test 06/30/20  0111 09/29/12  1320   ALT 16 18   CR 0.59* 0.81   GFRESTIMATED >90 >90   GFRESTBLACK >90 >90   POTASSIUM 3.9 4.2      BP Readings from Last 3 Encounters:   06/30/20 (!) 157/97   02/28/18 132/80   01/02/17 (!) 165/108    Wt Readings from Last 3 Encounters:   02/28/18 68.4 kg (150 lb 14.4 oz)   01/02/17 71 kg (156 lb 9.6 oz)   10/06/12 74.4 kg (164 lb)                    Reviewed and updated as needed this visit by Provider         Review of Systems   Constitutional, HEENT, cardiovascular, pulmonary, GI, , musculoskeletal, neuro, skin, endocrine and psych systems are  negative, except as otherwise noted.       Objective   Reported vitals:  There were no vitals taken for this visit.   healthy, alert and no distress  PSYCH: Alert and oriented times 3; coherent speech, normal   rate and volume, able to articulate logical thoughts, able   to abstract reason, no tangential thoughts, no hallucinations   or delusions  His affect is normal  RESP: No cough, no audible wheezing, able to talk in full sentences  Remainder of exam unable to be completed due to telephone visits    Diagnostic Test Results:  Labs reviewed in Epic  No results found for this or any previous visit (from the past 24 hour(s)).        Assessment/Plan:    ICD-10-CM    1. Scabies infestation  B86 permethrin (ELIMITE) 5 % external cream   2. Anemia, unspecified type  D64.9 Ferritin     Hemoglobin   3. Vitamin D deficiency  E55.9 Vitamin D Deficiency   4. Nutritional deficiency  E63.9 Basic metabolic panel     Hemoglobin   5. Abdominal pain, generalized  R10.84 TSH with free T4 reflex   6. Lipid screening  Z13.220 Lipid panel reflex to direct LDL Fasting    ER follow up abd pain resolved, likely indigestion or GERD, needs lab work follow up of above issues    Due for health maintenance exam will schedule soonest in person avail  lab only appointment fasting tomorrow     Return in about 1 day (around 7/9/2020) for Lab Work, next annual exam or as needed.      Phone call duration:  15 minutes    CRISTIAN Roth CNP

## 2020-07-09 DIAGNOSIS — R10.84 ABDOMINAL PAIN, GENERALIZED: ICD-10-CM

## 2020-07-09 DIAGNOSIS — E55.9 VITAMIN D DEFICIENCY: ICD-10-CM

## 2020-07-09 DIAGNOSIS — D64.9 ANEMIA, UNSPECIFIED TYPE: ICD-10-CM

## 2020-07-09 DIAGNOSIS — E63.9 NUTRITIONAL DEFICIENCY: ICD-10-CM

## 2020-07-09 DIAGNOSIS — Z13.220 LIPID SCREENING: ICD-10-CM

## 2020-07-09 LAB
ANION GAP SERPL CALCULATED.3IONS-SCNC: 4 MMOL/L (ref 3–14)
BUN SERPL-MCNC: 6 MG/DL (ref 7–30)
CALCIUM SERPL-MCNC: 8.7 MG/DL (ref 8.5–10.1)
CHLORIDE SERPL-SCNC: 104 MMOL/L (ref 94–109)
CHOLEST SERPL-MCNC: 127 MG/DL
CO2 SERPL-SCNC: 27 MMOL/L (ref 20–32)
CREAT SERPL-MCNC: 0.81 MG/DL (ref 0.66–1.25)
FERRITIN SERPL-MCNC: 97 NG/ML (ref 26–388)
GFR SERPL CREATININE-BSD FRML MDRD: >90 ML/MIN/{1.73_M2}
GLUCOSE SERPL-MCNC: 90 MG/DL (ref 70–99)
HDLC SERPL-MCNC: 48 MG/DL
HGB BLD-MCNC: 12.8 G/DL (ref 13.3–17.7)
LDLC SERPL CALC-MCNC: 53 MG/DL
NONHDLC SERPL-MCNC: 79 MG/DL
POTASSIUM SERPL-SCNC: 4.4 MMOL/L (ref 3.4–5.3)
SODIUM SERPL-SCNC: 135 MMOL/L (ref 133–144)
TRIGL SERPL-MCNC: 129 MG/DL
TSH SERPL DL<=0.005 MIU/L-ACNC: 1.88 MU/L (ref 0.4–4)

## 2020-07-09 PROCEDURE — 80061 LIPID PANEL: CPT | Performed by: NURSE PRACTITIONER

## 2020-07-09 PROCEDURE — 84443 ASSAY THYROID STIM HORMONE: CPT | Performed by: NURSE PRACTITIONER

## 2020-07-09 PROCEDURE — 36415 COLL VENOUS BLD VENIPUNCTURE: CPT | Performed by: NURSE PRACTITIONER

## 2020-07-09 PROCEDURE — 85018 HEMOGLOBIN: CPT | Performed by: NURSE PRACTITIONER

## 2020-07-09 PROCEDURE — 82306 VITAMIN D 25 HYDROXY: CPT | Performed by: NURSE PRACTITIONER

## 2020-07-09 PROCEDURE — 80048 BASIC METABOLIC PNL TOTAL CA: CPT | Performed by: NURSE PRACTITIONER

## 2020-07-09 PROCEDURE — 82728 ASSAY OF FERRITIN: CPT | Performed by: NURSE PRACTITIONER

## 2020-07-10 LAB — DEPRECATED CALCIDIOL+CALCIFEROL SERPL-MC: 7 UG/L (ref 20–75)

## 2020-07-14 ENCOUNTER — TELEPHONE (OUTPATIENT)
Dept: FAMILY MEDICINE | Facility: CLINIC | Age: 55
End: 2020-07-14

## 2020-07-14 DIAGNOSIS — E55.9 VITAMIN D DEFICIENCY: Primary | ICD-10-CM

## 2020-07-14 NOTE — TELEPHONE ENCOUNTER
Pt notified of results and reviewed dosing. He would like Rx sent to [facility].      Arlin Newman, CRISTIAN CNP   7/13/2020  1:07 PM       Vit D Low:   we'll replace this with 50,000 units Vit D3 (or D2 if all their insurance will cover) weekly for 8 weeks. Please verbal and ask which pharmacy       After the 8 weeks, take 5000 units Vit D daily (OTC) from then on. We could consider rechecking this level in a year.       Thanks,   Arlin Newman FNP-C            Orders done

## 2020-07-15 ENCOUNTER — OFFICE VISIT (OUTPATIENT)
Dept: FAMILY MEDICINE | Facility: CLINIC | Age: 55
End: 2020-07-15
Payer: COMMERCIAL

## 2020-07-15 VITALS
OXYGEN SATURATION: 100 % | RESPIRATION RATE: 20 BRPM | DIASTOLIC BLOOD PRESSURE: 76 MMHG | SYSTOLIC BLOOD PRESSURE: 132 MMHG | TEMPERATURE: 98.2 F | BODY MASS INDEX: 25.49 KG/M2 | HEART RATE: 92 BPM | HEIGHT: 62 IN | WEIGHT: 138.5 LBS

## 2020-07-15 DIAGNOSIS — Z12.11 SPECIAL SCREENING FOR MALIGNANT NEOPLASMS, COLON: ICD-10-CM

## 2020-07-15 DIAGNOSIS — Z00.00 ROUTINE GENERAL MEDICAL EXAMINATION AT A HEALTH CARE FACILITY: Primary | ICD-10-CM

## 2020-07-15 DIAGNOSIS — D50.8 IRON DEFICIENCY ANEMIA SECONDARY TO INADEQUATE DIETARY IRON INTAKE: ICD-10-CM

## 2020-07-15 DIAGNOSIS — Z78.9 VEGAN DIET: ICD-10-CM

## 2020-07-15 DIAGNOSIS — B86 SCABIES INFESTATION: ICD-10-CM

## 2020-07-15 PROCEDURE — 99396 PREV VISIT EST AGE 40-64: CPT | Mod: 25 | Performed by: NURSE PRACTITIONER

## 2020-07-15 PROCEDURE — 90707 MMR VACCINE SC: CPT | Performed by: NURSE PRACTITIONER

## 2020-07-15 PROCEDURE — 90715 TDAP VACCINE 7 YRS/> IM: CPT | Performed by: NURSE PRACTITIONER

## 2020-07-15 PROCEDURE — 90471 IMMUNIZATION ADMIN: CPT | Performed by: NURSE PRACTITIONER

## 2020-07-15 PROCEDURE — 90472 IMMUNIZATION ADMIN EACH ADD: CPT | Performed by: NURSE PRACTITIONER

## 2020-07-15 RX ORDER — FERROUS SULFATE 325(65) MG
325 TABLET ORAL
Qty: 90 TABLET | Refills: 3 | Status: ON HOLD | OUTPATIENT
Start: 2020-07-15 | End: 2020-11-24

## 2020-07-15 RX ORDER — PERMETHRIN 50 MG/G
CREAM TOPICAL
Qty: 60 G | Refills: 3 | Status: ON HOLD | OUTPATIENT
Start: 2020-07-15 | End: 2020-11-24

## 2020-07-15 ASSESSMENT — MIFFLIN-ST. JEOR: SCORE: 1339.48

## 2020-07-15 NOTE — Clinical Note
Could you see if he needs help scheduling the colonoscopy? I forgot to ask, very nice yomaira speaks English :)  Thanks! Arlin

## 2020-07-15 NOTE — PATIENT INSTRUCTIONS
Preventive Health Recommendations  Male Ages 50 - 64    Yearly exam:             See your health care provider every year in order to  o   Review health changes.   o   Discuss preventive care.    o   Review your medicines if your doctor has prescribed any.     Have a cholesterol test every 5 years, or more frequently if you are at risk for high cholesterol/heart disease.     Have a diabetes test (fasting glucose) every three years. If you are at risk for diabetes, you should have this test more often.     Have a colonoscopy at age 50, or have a yearly FIT test (stool test). These exams will check for colon cancer.      Talk with your health care provider about whether or not a prostate cancer screening test (PSA) is right for you.    You should be tested each year for STDs (sexually transmitted diseases), if you re at risk.     Shots: Get a flu shot each year. Get a tetanus shot every 10 years.     Nutrition:    Eat at least 5 servings of fruits and vegetables daily.     Eat whole-grain bread, whole-wheat pasta and brown rice instead of white grains and rice.     Get adequate Calcium and Vitamin D.     Lifestyle    Exercise for at least 150 minutes a week (30 minutes a day, 5 days a week). This will help you control your weight and prevent disease.     Limit alcohol to one drink per day.     No smoking.     Wear sunscreen to prevent skin cancer.     See your dentist every six months for an exam and cleaning.     See your eye doctor every 1 to 2 years.    Preventive Health Recommendations  Male Ages 50 - 64    Yearly exam:             See your health care provider every year in order to  o   Review health changes.   o   Discuss preventive care.    o   Review your medicines if your doctor has prescribed any.     Have a cholesterol test every 5 years, or more frequently if you are at risk for high cholesterol/heart disease.     Have a diabetes test (fasting glucose) every three years. If you are at risk for diabetes,  you should have this test more often.     Have a colonoscopy at age 50, or have a yearly FIT test (stool test). These exams will check for colon cancer.      Talk with your health care provider about whether or not a prostate cancer screening test (PSA) is right for you.    You should be tested each year for STDs (sexually transmitted diseases), if you re at risk.     Shots: Get a flu shot each year. Get a tetanus shot every 10 years.     Nutrition:    Eat at least 5 servings of fruits and vegetables daily.     Eat whole-grain bread, whole-wheat pasta and brown rice instead of white grains and rice.     Get adequate Calcium and Vitamin D.     Lifestyle    Exercise for at least 150 minutes a week (30 minutes a day, 5 days a week). This will help you control your weight and prevent disease.     Limit alcohol to one drink per day.     No smoking.     Wear sunscreen to prevent skin cancer.     See your dentist every six months for an exam and cleaning.     See your eye doctor every 1 to 2 years.    Patient Education     Eating a Vegetarian Diet  A vegetarian diet is based on plant foods. It includes fruits, vegetables, beans, grains, seeds, and nuts. Some vegetarians also eat dairy foods and eggs. There are 3 common vegetarian diets:    Lacto-ovo vegetarians eat eggs, yogurt, cheese, and other milk products, as well as plant foods.    Lacto vegetarians eat dairy and plant foods but not eggs.    Vegans eat only plant foods.  Why eat vegetarian?  People choose to be vegetarians for health, cultural, social, and Adventism reasons. A vegetarian diet is a healthy way to eat. You just have to plan your meals carefully so that you get all the nutrients you need. Most vegetarian diets are high in fiber and low in fat and cholesterol. That means eating vegetarian can:    Lower your risk of heart disease    Lower your blood pressure and cholesterol levels    Help you stay at a healthy weight    Lower your risk of  diabetes    Lower your risk of cancer    Decrease digestive problems including:  ? Bowel diseases  ? Gallstones  ? Colon cancer  Vegetarian basics  A vegetarian diet can be a healthy way to eat for people of all ages. But meals and snacks must be planned to include non-meat sources of protein, vitamins, and other nutrients. (See the chart below.) Here are some guidelines for healthy meal planning:    Eat a wide range of foods. This will help you get all the nutrients you need.    Eat a number of plant proteins throughout the day.    Plan for enough calories each day. Also make sure that your calories come from foods that are rich in protein, vitamins, and minerals.    If you eat dairy foods, choose low-fat or fat-free milk, yogurt, or cheese.  Do you need supplements?  A vegetarian diet can easily supply all the calories, protein, vitamins, and minerals that a person needs. But some people have special needs. They may include children and teens, pregnant and lactating women, women past midlife, the elderly, and vegans. If you are in one of these groups, you may need extra calories, protein, calcium, iron, vitamin B12, or zinc. The lists below can help you choose foods that are good sources of these nutrients. And be sure to ask your healthcare provider about taking vitamin supplements.  Protein    Dried beans, soybeans, and lentils    Tofu (bean curd) and tempeh (cultured soybeans)    Rice, barley, and other whole grains    Nuts and nut butter    Milk, yogurt, and cheese    Eggs    Casein    Seitan (also called wheat gluten) Vitamin B12    Milk, yogurt, and cheese    Eggs    Fortified soy burgers    Fortified soy milk or other nondairy milk    Fortified cereals    Nutritional yeast   Zinc    Milk, yogurt, and cheese    Eggs    Canned or dried beans    Lentils and split peas    Wheat germ    Whole-grain breads and cereals    Nuts and nut butters    Pumpkin and sunflower seeds Calcium    Milk, yogurt, and  cheese    Fortified soy milk or other nondairy milk    Tofu processed with calcium sulfate    Leafy, dark-green vegetables    Dried figs    Fortified orange juice and fortified cereals    Sesame seeds    Beans   Iron    Wheat germ    Dried fruits    Nuts and seeds    Whole grain and fortified breads and cereals    Dried beans, lentils, and split peas    Leafy, dark-green vegetables    Eggs     Getting started  Change to a vegetarian diet slowly. Start by eating more grains, beans, vegetables, and fruits. Make fish, poultry, or meat a side dish. Then slowly cut them out of your diet. Here are some other tips:    Eat 3 or more servings of vegetables a day. Eat them raw or lightly steamed.    Eat 2 or more servings of fruit a day. Choose whole fruits with the skin on.    Choose a wide range of grains and whole-grain breads and cereals. Eat 6 or more servings of these foods each day.    Begin to replace meat by working up to 2 to 3 servings a day of beans, lentils, split peas, tofu, or tempeh.    If you eat dairy foods, have 2 to 3 servings a day. Make low-fat or fat-free choices.  For vegans: Add sources of calcium and vitamin B12, such as fortified nondairy milks and breakfast cereals. Talk with your healthcare provider about vitamin supplements.  To learn more  A registered dietitian (RD) can help you plan a healthy vegetarian diet. For more information and to find an RD who knows about vegetarian diets, search for one through the Vegetarian Nutrition Dietetic Practice Group of the Academy of Nutrition and Dietetics (AND) at their website, www.vegetariannutrition.net. You can also search AND's website, www.eatright.org. Other groups that can help include:    Vegetarian Resource Group (www.vrg.org)    American Cancer Society (www.cancer.org)    American Heart Association (www.heart.org)  Date Last Reviewed: 8/1/2017 2000-2019 [employer]. [address] [place] [address], [place] [address]. All rights reserved.  This information is not intended as a substitute for professional medical care. Always follow your healthcare professional's instructions.

## 2020-07-15 NOTE — PROGRESS NOTES
3  SUBJECTIVE:   CC: Rony Pandya is an 54 year old male who presents for preventive health visit.     Healthy Habits:    Do you get at least three servings of calcium containing foods daily (dairy, green leafy vegetables, etc.)? yes    Amount of exercise or daily activities, outside of work: 7 day(s) per week    Problems taking medications regularly No    Medication side effects: No    Have you had an eye exam in the past two years? yes    Do you see a dentist twice per year? yes    Do you have sleep apnea, excessive snoring or daytime drowsiness?no      Vegan, active lifestyle  Many family back home, mom here and wife, has 3 children  Iron deficiency anemia noted, not taking iron  Will start Vit D replacement  Lab otherwise look great anemia     Today's PHQ-2 Score:   PHQ-2 ( 1999 Pf[employer]) 7/15/2020 12/18/2019   Q1: Little interest or pleasure in doing things 0 0   Q2: Feeling down, depressed or hopeless 0 0   PHQ-2 Score 0 0       Abuse: Current or Past(Physical, Sexual or Emotional)- No  Do you feel safe in your environment? Yes    Have you ever done Advance Care Planning? (For example, a Health Directive, POLST, or a discussion with a medical provider or your loved ones about your wishes): No, advance care planning information given to patient to review.  Patient declined advance care planning discussion at this time. then did agree to do with Provider, see notes    Social History     Tobacco Use     Smoking status: Never Smoker     Smokeless tobacco: Never Used   Substance Use Topics     Alcohol use: No     If you drink alcohol do you typically have >3 drinks per day or >7 drinks per week? No                      Last PSA: No results found for: PSA    Reviewed orders with patient. Reviewed health maintenance and updated orders accordingly - Yes  Labs reviewed in EPIC  BP Readings from Last 3 Encounters:   07/15/20 132/76   06/30/20 (!) 157/97   02/28/18 132/80    Wt Readings from Last 3 Encounters:    07/15/20 62.8 kg (138 lb 8 oz)   02/28/18 68.4 kg (150 lb 14.4 oz)   01/02/17 71 kg (156 lb 9.6 oz)                  There is no problem list on file for this patient.    History reviewed. No pertinent surgical history.    Social History     Tobacco Use     Smoking status: Never Smoker     Smokeless tobacco: Never Used   Substance Use Topics     Alcohol use: No     History reviewed. No pertinent family history.      Current Outpatient Medications   Medication Sig Dispense Refill     ferrous sulfate (FEROSUL) 325 (65 Fe) MG tablet Take 1 tablet (325 mg) by mouth daily (with breakfast) 90 tablet 3     permethrin (ELIMITE) 5 % external cream Apply cream from head to toe (except the face); leave on for 8-14 hours then wash off with water; reapply in 1 week if live mites appear. 60 g 3     vitamin D3 (CHOLECALCIFEROL) 1.25 MG (40468 UT) capsule Take 1 capsule (50,000 Units) by mouth every 7 days for 8 doses 8 capsule 0     carboxymethylcellulose PF (CARBOXYMETHYLCELLULOSE SODIUM) 0.5 % ophthalmic solution Place 1 drop into both eyes 4 times daily (Patient not taking: Reported on 7/8/2020) 1 mL 11     guaiFENesin (MUCINEX) 600 MG 12 hr tablet Take 1 tablet (600 mg) by mouth 2 times daily as needed for congestion (Patient not taking: Reported on 7/8/2020) 20 tablet 0     ibuprofen (ADVIL/MOTRIN) 600 MG tablet Take 1 tablet (600 mg) by mouth every 8 hours as needed for moderate pain (Patient not taking: Reported on 7/8/2020) 20 tablet 0     No Known Allergies  Recent Labs   Lab Test 07/09/20  0816 06/30/20  0111 09/29/12  1320   LDL 53  --   --    HDL 48  --   --    TRIG 129  --   --    ALT  --  16 18   CR 0.81 0.59* 0.81   GFRESTIMATED >90 >90 >90   GFRESTBLACK >90 >90 >90   POTASSIUM 4.4 3.9 4.2   TSH 1.88  --   --         Reviewed and updated as needed this visit by clinical staff  Tobacco  Allergies  Meds  Problems  Med Hx  Surg Hx  Fam Hx         Reviewed and updated as needed this visit by Provider       "      ROS:  CONSTITUTIONAL: NEGATIVE for fever, chills, change in weight  INTEGUMENTARY/SKIN: NEGATIVE for worrisome rashes, moles or lesions  EYES: NEGATIVE for vision changes or irritation  ENT: NEGATIVE for ear, mouth and throat problems  RESP: NEGATIVE for significant cough or SOB  CV: NEGATIVE for chest pain, palpitations or peripheral edema  GI: NEGATIVE for nausea, abdominal pain, heartburn, or change in bowel habits   male: negative for dysuria, hematuria, decreased urinary stream, erectile dysfunction, urethral discharge  MUSCULOSKELETAL: NEGATIVE for significant arthralgias or myalgia  NEURO: NEGATIVE for weakness, dizziness or paresthesias  PSYCHIATRIC: NEGATIVE for changes in mood or affect    OBJECTIVE:   /76   Pulse 92   Temp 98.2  F (36.8  C) (Oral)   Resp 20   Ht 1.57 m (5' 1.81\")   Wt 62.8 kg (138 lb 8 oz)   SpO2 100%   BMI 25.49 kg/m    EXAM:  GENERAL: healthy, alert and no distress  EYES: Eyes grossly normal to inspection, PERRL and conjunctivae and sclerae normal  HENT: ear canals and TM's normal, nose and mouth without ulcers or lesions  NECK: no adenopathy, no asymmetry, masses, or scars and thyroid normal to palpation  RESP: lungs clear to auscultation - no rales, rhonchi or wheezes  CV: regular rate and rhythm, normal S1 S2, no S3 or S4, no murmur, click or rub, no peripheral edema and peripheral pulses strong  ABDOMEN: soft, nontender, no hepatosplenomegaly, no masses and bowel sounds normal  MS: no gross musculoskeletal defects noted, no edema  SKIN: no suspicious lesions or rashes  NEURO: Normal strength and tone, mentation intact and speech normal  PSYCH: mentation appears normal, affect normal/bright    Diagnostic Test Results:  Labs reviewed in Epic    ASSESSMENT/PLAN:       ICD-10-CM    1. Routine general medical examination at a health care facility  Z00.00    2. Scabies infestation  B86 permethrin (ELIMITE) 5 % external cream   3. Iron deficiency anemia secondary to " "inadequate dietary iron intake  D50.8 ferrous sulfate (FEROSUL) 325 (65 Fe) MG tablet   4. Vegan diet  Z78.9    healthy, discussed iron deficiency anemia and see patient instructions   After vit D weekly should take 5000 units daily ongoing     COUNSELING:  Reviewed preventive health counseling, as reflected in patient instructions       Regular exercise       Healthy diet/nutrition       Vision screening       Immunizations    Vaccinated for: MMR and TDAP             Consider Hep C screening for patients born between 1945 and 1965       HIV screeninx in teen years, 1x in adult years, and at intervals if high risk       Colon cancer screening       Prostate cancer screening       Advance Care Planning    Estimated body mass index is 25.49 kg/m  as calculated from the following:    Height as of this encounter: 1.57 m (5' 1.81\").    Weight as of this encounter: 62.8 kg (138 lb 8 oz).         reports that he has never smoked. He has never used smokeless tobacco.      Counseling Resources:  ATP IV Guidelines  Pooled Cohorts Equation Calculator  FRAX Risk Assessment  ICSI Preventive Guidelines  Dietary Guidelines for Americans, 2010  USDA's MyPlate  ASA Prophylaxis  Lung CA Screening    CRISTIAN Roth CNP  [facility]  3  "

## 2020-08-03 ENCOUNTER — TELEPHONE (OUTPATIENT)
Dept: FAMILY MEDICINE | Facility: CLINIC | Age: 55
End: 2020-08-03
Payer: COMMERCIAL

## 2020-08-07 NOTE — PROGRESS NOTES
Called patient and left a vm today for a follow up from his appointment on 07/15 with Arlin Newman. Just checking to see if he scheduled his Colonoscopy. If he had not done so yet, I left the phone numbers to call on his voicemail. I also advised he give the clinic a call back with any questions or if he needs help scheduling.    Gogo Collazo CMA.

## 2020-10-09 ENCOUNTER — TELEPHONE (OUTPATIENT)
Dept: GASTROENTEROLOGY | Facility: CLINIC | Age: 55
End: 2020-10-09

## 2020-10-09 NOTE — TELEPHONE ENCOUNTER
Talked with patient. Not currently interested in scheduling Colonoscopy, but will call back to schedule when ready.

## 2020-10-13 ENCOUNTER — TELEPHONE (OUTPATIENT)
Dept: FAMILY MEDICINE | Facility: CLINIC | Age: 55
End: 2020-10-13

## 2020-10-13 DIAGNOSIS — D50.8 IRON DEFICIENCY ANEMIA SECONDARY TO INADEQUATE DIETARY IRON INTAKE: ICD-10-CM

## 2020-10-13 DIAGNOSIS — E55.9 VITAMIN D DEFICIENCY: Primary | ICD-10-CM

## 2020-10-13 NOTE — TELEPHONE ENCOUNTER
Reason for call:  Other   Patient called regarding (reason for call): Labs request  Additional comments:     Patient called in to make a f/u lab appointment to retest his vitamin D and iron levels. Patient made appointment for this Thursday, 10/15/2020 at 9:45AM.     Phone number to reach patient:  Cell number on file:    Telephone Information:   Mobile [phone]       Best Time:  N/A    Can we leave a detailed message on this number?  Not Applicable    Travel screening: Not Applicable

## 2020-10-13 NOTE — TELEPHONE ENCOUNTER
Arlin,    Please see message below. Per result notes on 7/9/20 patients vitamin D level to be checked in one year. Also no mention of repeating iron levels as they were normal. Please advise if he needs labs, he has appt on 10/15    Thanks  Grace Sousa RN   [facility]

## 2020-10-14 NOTE — TELEPHONE ENCOUNTER
Patient denies new concerns or symptoms - scheduled lab only - he verbalized understanding and agrees with plan

## 2020-10-14 NOTE — TELEPHONE ENCOUNTER
Assuming this is for follow up of last visit and no new symptoms or concerns: Yes can get lab work done either today or just prior to appointment tomorrow, orders entered, doesn't need to fast. thanks

## 2020-10-15 DIAGNOSIS — E55.9 VITAMIN D DEFICIENCY: ICD-10-CM

## 2020-10-15 DIAGNOSIS — D50.8 IRON DEFICIENCY ANEMIA SECONDARY TO INADEQUATE DIETARY IRON INTAKE: ICD-10-CM

## 2020-10-15 LAB
ERYTHROCYTE [DISTWIDTH] IN BLOOD BY AUTOMATED COUNT: 11.9 % (ref 10–15)
HCT VFR BLD AUTO: 38.8 % (ref 40–53)
HGB BLD-MCNC: 12.9 G/DL (ref 13.3–17.7)
MCH RBC QN AUTO: 33.7 PG (ref 26.5–33)
MCHC RBC AUTO-ENTMCNC: 33.2 G/DL (ref 31.5–36.5)
MCV RBC AUTO: 101 FL (ref 78–100)
PLATELET # BLD AUTO: 203 10E9/L (ref 150–450)
RBC # BLD AUTO: 3.83 10E12/L (ref 4.4–5.9)
WBC # BLD AUTO: 6.2 10E9/L (ref 4–11)

## 2020-10-15 PROCEDURE — 85027 COMPLETE CBC AUTOMATED: CPT | Performed by: NURSE PRACTITIONER

## 2020-10-15 PROCEDURE — 82306 VITAMIN D 25 HYDROXY: CPT | Performed by: NURSE PRACTITIONER

## 2020-10-15 PROCEDURE — 36415 COLL VENOUS BLD VENIPUNCTURE: CPT | Performed by: NURSE PRACTITIONER

## 2020-10-20 LAB
DEPRECATED CALCIDIOL+CALCIFEROL SERPL-MC: <71 UG/L (ref 20–75)
VITAMIN D2 SERPL-MCNC: <5 UG/L
VITAMIN D3 SERPL-MCNC: 66 UG/L

## 2020-10-21 ENCOUNTER — DOCUMENTATION ONLY (OUTPATIENT)
Dept: OTHER | Facility: CLINIC | Age: 55
End: 2020-10-21

## 2020-11-23 ENCOUNTER — NURSE TRIAGE (OUTPATIENT)
Dept: NURSING | Facility: CLINIC | Age: 55
End: 2020-11-23

## 2020-11-23 ENCOUNTER — HOSPITAL ENCOUNTER (INPATIENT)
Facility: CLINIC | Age: 55
LOS: 1 days | Discharge: HOME | End: 2020-11-24
Attending: FAMILY MEDICINE | Admitting: INTERNAL MEDICINE
Payer: COMMERCIAL

## 2020-11-23 ENCOUNTER — APPOINTMENT (OUTPATIENT)
Dept: GENERAL RADIOLOGY | Facility: CLINIC | Age: 55
End: 2020-11-23
Attending: FAMILY MEDICINE
Payer: COMMERCIAL

## 2020-11-23 DIAGNOSIS — U07.1 2019 NOVEL CORONAVIRUS DISEASE (COVID-19): ICD-10-CM

## 2020-11-23 DIAGNOSIS — R50.9 FEBRILE ILLNESS: ICD-10-CM

## 2020-11-23 DIAGNOSIS — J18.9 PNEUMONIA OF LEFT LOWER LOBE DUE TO INFECTIOUS ORGANISM: ICD-10-CM

## 2020-11-23 DIAGNOSIS — E86.0 DEHYDRATION: ICD-10-CM

## 2020-11-23 DIAGNOSIS — E87.1 HYPONATREMIA: ICD-10-CM

## 2020-11-23 LAB
ALBUMIN SERPL-MCNC: 3.5 G/DL (ref 3.4–5)
ALP SERPL-CCNC: 76 U/L (ref 40–150)
ALT SERPL W P-5'-P-CCNC: 26 U/L (ref 0–70)
ANION GAP SERPL CALCULATED.3IONS-SCNC: 5 MMOL/L (ref 3–14)
APTT PPP: 38 SEC (ref 22–37)
AST SERPL W P-5'-P-CCNC: 35 U/L (ref 0–45)
BASOPHILS # BLD AUTO: 0 10E9/L (ref 0–0.2)
BASOPHILS NFR BLD AUTO: 0.2 %
BILIRUB SERPL-MCNC: 0.5 MG/DL (ref 0.2–1.3)
BUN SERPL-MCNC: 7 MG/DL (ref 7–30)
CALCIUM SERPL-MCNC: 8.1 MG/DL (ref 8.5–10.1)
CHLORIDE SERPL-SCNC: 93 MMOL/L (ref 94–109)
CO2 SERPL-SCNC: 28 MMOL/L (ref 20–32)
CREAT SERPL-MCNC: 0.8 MG/DL (ref 0.66–1.25)
CRP SERPL-MCNC: 76 MG/L (ref 0–8)
DIFFERENTIAL METHOD BLD: ABNORMAL
EOSINOPHIL # BLD AUTO: 0 10E9/L (ref 0–0.7)
EOSINOPHIL NFR BLD AUTO: 0 %
ERYTHROCYTE [DISTWIDTH] IN BLOOD BY AUTOMATED COUNT: 11.9 % (ref 10–15)
GFR SERPL CREATININE-BSD FRML MDRD: >90 ML/MIN/{1.73_M2}
GLUCOSE SERPL-MCNC: 103 MG/DL (ref 70–99)
HCT VFR BLD AUTO: 37.1 % (ref 40–53)
HGB BLD-MCNC: 12.8 G/DL (ref 13.3–17.7)
IMM GRANULOCYTES # BLD: 0 10E9/L (ref 0–0.4)
IMM GRANULOCYTES NFR BLD: 0.6 %
INR PPP: 1.02 (ref 0.86–1.14)
INTERPRETATION ECG - MUSE: NORMAL
LIPASE SERPL-CCNC: 192 U/L (ref 73–393)
LYMPHOCYTES # BLD AUTO: 0.9 10E9/L (ref 0.8–5.3)
LYMPHOCYTES NFR BLD AUTO: 17.3 %
MCH RBC QN AUTO: 33.1 PG (ref 26.5–33)
MCHC RBC AUTO-ENTMCNC: 34.5 G/DL (ref 31.5–36.5)
MCV RBC AUTO: 96 FL (ref 78–100)
MONOCYTES # BLD AUTO: 0.6 10E9/L (ref 0–1.3)
MONOCYTES NFR BLD AUTO: 11.3 %
NEUTROPHILS # BLD AUTO: 3.7 10E9/L (ref 1.6–8.3)
NEUTROPHILS NFR BLD AUTO: 70.6 %
NRBC # BLD AUTO: 0 10*3/UL
NRBC BLD AUTO-RTO: 0 /100
NT-PROBNP SERPL-MCNC: 33 PG/ML (ref 0–900)
PLATELET # BLD AUTO: 174 10E9/L (ref 150–450)
POTASSIUM SERPL-SCNC: 4.1 MMOL/L (ref 3.4–5.3)
PROCALCITONIN SERPL-MCNC: <0.05 NG/ML
PROT SERPL-MCNC: 7.6 G/DL (ref 6.8–8.8)
RBC # BLD AUTO: 3.87 10E12/L (ref 4.4–5.9)
SODIUM SERPL-SCNC: 126 MMOL/L (ref 133–144)
SODIUM SERPL-SCNC: 132 MMOL/L (ref 133–144)
TROPONIN I SERPL-MCNC: <0.015 UG/L (ref 0–0.04)
WBC # BLD AUTO: 5.2 10E9/L (ref 4–11)

## 2020-11-23 PROCEDURE — 96361 HYDRATE IV INFUSION ADD-ON: CPT | Performed by: FAMILY MEDICINE

## 2020-11-23 PROCEDURE — 93005 ELECTROCARDIOGRAM TRACING: CPT | Performed by: FAMILY MEDICINE

## 2020-11-23 PROCEDURE — 85025 COMPLETE CBC W/AUTO DIFF WBC: CPT | Performed by: FAMILY MEDICINE

## 2020-11-23 PROCEDURE — 93010 ELECTROCARDIOGRAM REPORT: CPT | Performed by: FAMILY MEDICINE

## 2020-11-23 PROCEDURE — 80053 COMPREHEN METABOLIC PANEL: CPT | Performed by: FAMILY MEDICINE

## 2020-11-23 PROCEDURE — 36415 COLL VENOUS BLD VENIPUNCTURE: CPT | Performed by: INTERNAL MEDICINE

## 2020-11-23 PROCEDURE — 96365 THER/PROPH/DIAG IV INF INIT: CPT | Performed by: FAMILY MEDICINE

## 2020-11-23 PROCEDURE — 96375 TX/PRO/DX INJ NEW DRUG ADDON: CPT | Performed by: FAMILY MEDICINE

## 2020-11-23 PROCEDURE — 250N000011 HC RX IP 250 OP 636: Performed by: FAMILY MEDICINE

## 2020-11-23 PROCEDURE — 96366 THER/PROPH/DIAG IV INF ADDON: CPT | Performed by: FAMILY MEDICINE

## 2020-11-23 PROCEDURE — 120N000002 HC R&B MED SURG/OB UMMC

## 2020-11-23 PROCEDURE — 250N000013 HC RX MED GY IP 250 OP 250 PS 637: Performed by: INTERNAL MEDICINE

## 2020-11-23 PROCEDURE — 71045 X-RAY EXAM CHEST 1 VIEW: CPT

## 2020-11-23 PROCEDURE — 96368 THER/DIAG CONCURRENT INF: CPT | Performed by: FAMILY MEDICINE

## 2020-11-23 PROCEDURE — 85610 PROTHROMBIN TIME: CPT | Performed by: FAMILY MEDICINE

## 2020-11-23 PROCEDURE — 83880 ASSAY OF NATRIURETIC PEPTIDE: CPT | Performed by: FAMILY MEDICINE

## 2020-11-23 PROCEDURE — 258N000003 HC RX IP 258 OP 636: Performed by: FAMILY MEDICINE

## 2020-11-23 PROCEDURE — 250N000013 HC RX MED GY IP 250 OP 250 PS 637: Performed by: FAMILY MEDICINE

## 2020-11-23 PROCEDURE — 84295 ASSAY OF SERUM SODIUM: CPT | Performed by: INTERNAL MEDICINE

## 2020-11-23 PROCEDURE — 258N000003 HC RX IP 258 OP 636: Performed by: INTERNAL MEDICINE

## 2020-11-23 PROCEDURE — 84484 ASSAY OF TROPONIN QUANT: CPT | Performed by: FAMILY MEDICINE

## 2020-11-23 PROCEDURE — 99285 EMERGENCY DEPT VISIT HI MDM: CPT | Mod: 25 | Performed by: FAMILY MEDICINE

## 2020-11-23 PROCEDURE — 85730 THROMBOPLASTIN TIME PARTIAL: CPT | Performed by: FAMILY MEDICINE

## 2020-11-23 PROCEDURE — 86140 C-REACTIVE PROTEIN: CPT | Performed by: FAMILY MEDICINE

## 2020-11-23 PROCEDURE — 99223 1ST HOSP IP/OBS HIGH 75: CPT | Mod: AI | Performed by: INTERNAL MEDICINE

## 2020-11-23 PROCEDURE — 83690 ASSAY OF LIPASE: CPT | Performed by: FAMILY MEDICINE

## 2020-11-23 PROCEDURE — 84145 PROCALCITONIN (PCT): CPT | Performed by: INTERNAL MEDICINE

## 2020-11-23 RX ORDER — LIDOCAINE 40 MG/G
CREAM TOPICAL
Status: DISCONTINUED | OUTPATIENT
Start: 2020-11-23 | End: 2020-11-24 | Stop reason: HOSPADM

## 2020-11-23 RX ORDER — MAGNESIUM HYDROXIDE/ALUMINUM HYDROXICE/SIMETHICONE 120; 1200; 1200 MG/30ML; MG/30ML; MG/30ML
30 SUSPENSION ORAL EVERY 4 HOURS PRN
Status: DISCONTINUED | OUTPATIENT
Start: 2020-11-23 | End: 2020-11-24 | Stop reason: HOSPADM

## 2020-11-23 RX ORDER — ACETAMINOPHEN 325 MG/1
650 TABLET ORAL EVERY 4 HOURS PRN
Status: DISCONTINUED | OUTPATIENT
Start: 2020-11-23 | End: 2020-11-24 | Stop reason: HOSPADM

## 2020-11-23 RX ORDER — GUAIFENESIN/DEXTROMETHORPHAN 100-10MG/5
10 SYRUP ORAL EVERY 4 HOURS PRN
Status: DISCONTINUED | OUTPATIENT
Start: 2020-11-23 | End: 2020-11-24 | Stop reason: HOSPADM

## 2020-11-23 RX ORDER — AZITHROMYCIN 500 MG/5ML
500 INJECTION, POWDER, LYOPHILIZED, FOR SOLUTION INTRAVENOUS ONCE
Status: COMPLETED | OUTPATIENT
Start: 2020-11-23 | End: 2020-11-23

## 2020-11-23 RX ORDER — MAGNESIUM HYDROXIDE/ALUMINUM HYDROXICE/SIMETHICONE 120; 1200; 1200 MG/30ML; MG/30ML; MG/30ML
30 SUSPENSION ORAL ONCE
Status: COMPLETED | OUTPATIENT
Start: 2020-11-23 | End: 2020-11-23

## 2020-11-23 RX ORDER — ACETAMINOPHEN 500 MG
1000 TABLET ORAL ONCE
Status: COMPLETED | OUTPATIENT
Start: 2020-11-23 | End: 2020-11-23

## 2020-11-23 RX ORDER — ONDANSETRON 2 MG/ML
4 INJECTION INTRAMUSCULAR; INTRAVENOUS EVERY 6 HOURS PRN
Status: DISCONTINUED | OUTPATIENT
Start: 2020-11-23 | End: 2020-11-24 | Stop reason: HOSPADM

## 2020-11-23 RX ORDER — CEFTRIAXONE 1 G/1
1 INJECTION, POWDER, FOR SOLUTION INTRAMUSCULAR; INTRAVENOUS ONCE
Status: COMPLETED | OUTPATIENT
Start: 2020-11-23 | End: 2020-11-23

## 2020-11-23 RX ORDER — ONDANSETRON 2 MG/ML
4 INJECTION INTRAMUSCULAR; INTRAVENOUS EVERY 30 MIN PRN
Status: DISCONTINUED | OUTPATIENT
Start: 2020-11-23 | End: 2020-11-23

## 2020-11-23 RX ORDER — ONDANSETRON 4 MG/1
4 TABLET, ORALLY DISINTEGRATING ORAL EVERY 6 HOURS PRN
Status: DISCONTINUED | OUTPATIENT
Start: 2020-11-23 | End: 2020-11-24 | Stop reason: HOSPADM

## 2020-11-23 RX ORDER — SODIUM CHLORIDE 9 MG/ML
INJECTION, SOLUTION INTRAVENOUS CONTINUOUS
Status: DISCONTINUED | OUTPATIENT
Start: 2020-11-23 | End: 2020-11-23

## 2020-11-23 RX ORDER — IBUPROFEN 600 MG/1
600 TABLET, FILM COATED ORAL EVERY 6 HOURS PRN
Status: DISCONTINUED | OUTPATIENT
Start: 2020-11-23 | End: 2020-11-24 | Stop reason: HOSPADM

## 2020-11-23 RX ADMIN — DEXTROSE AND SODIUM CHLORIDE: 5; 900 INJECTION, SOLUTION INTRAVENOUS at 16:40

## 2020-11-23 RX ADMIN — FAMOTIDINE 20 MG: 20 INJECTION, SOLUTION INTRAVENOUS at 14:13

## 2020-11-23 RX ADMIN — SODIUM CHLORIDE 1000 ML: 9 INJECTION, SOLUTION INTRAVENOUS at 14:27

## 2020-11-23 RX ADMIN — DEXTROSE AND SODIUM CHLORIDE: 5; 900 INJECTION, SOLUTION INTRAVENOUS at 22:39

## 2020-11-23 RX ADMIN — AZITHROMYCIN MONOHYDRATE 500 MG: 500 INJECTION, POWDER, LYOPHILIZED, FOR SOLUTION INTRAVENOUS at 14:16

## 2020-11-23 RX ADMIN — CEFTRIAXONE SODIUM 1 G: 1 INJECTION, POWDER, FOR SOLUTION INTRAMUSCULAR; INTRAVENOUS at 14:24

## 2020-11-23 RX ADMIN — SODIUM CHLORIDE 1000 ML: 9 INJECTION, SOLUTION INTRAVENOUS at 12:40

## 2020-11-23 RX ADMIN — IBUPROFEN 600 MG: 600 TABLET, FILM COATED ORAL at 20:15

## 2020-11-23 RX ADMIN — ALUMINUM HYDROXIDE, MAGNESIUM HYDROXIDE, AND SIMETHICONE 30 ML: 200; 200; 20 SUSPENSION ORAL at 16:20

## 2020-11-23 RX ADMIN — ACETAMINOPHEN 1000 MG: 500 TABLET ORAL at 15:45

## 2020-11-23 ASSESSMENT — ENCOUNTER SYMPTOMS
SEIZURES: 0
DYSPHORIC MOOD: 1
ABDOMINAL PAIN: 1
WEAKNESS: 1
HEADACHES: 0
SHORTNESS OF BREATH: 0
APPETITE CHANGE: 0
VOMITING: 0
DIARRHEA: 1
BRUISES/BLEEDS EASILY: 0
LIGHT-HEADEDNESS: 1
DECREASED CONCENTRATION: 1
DYSURIA: 0
SORE THROAT: 0
FATIGUE: 1
NAUSEA: 1
FEVER: 0

## 2020-11-23 NOTE — ED NOTES
Bed: ED18  Expected date: 11/23/20  Expected time: 11:57 AM  Means of arrival:   Comments:  Positive Covid in lobby

## 2020-11-23 NOTE — ED PROVIDER NOTES
[facility] - [place], [place] EMERGENCY DEPARTMENT ([facility])   November 23, 2020  ED 18     History     Chief Complaint   Patient presents with     Fatigue     positive for covid last week: some weakness; not eating much;thought it would be good to get some iv fluid     The history is provided by the patient and medical records.     Rony Pandya is a 55 year old male who presents for further evaluation of generalized weakness and fatigue.  He had presented to [facility] urgent care on 11/15/2020 complaining of generalized malaise, body aches, rhinorrhea and headache.  He presented requesting COVID-19 testing and had positive results on 11/18/2020.  Since that time his headache has improved but he continues to have generalized body aches and now loss of sense of taste and smell.  He also has gastric discomfort with an acid taste.  He has mild nausea but no vomiting. He also had a little diarrhea this morning. He states that he has not had any history of peptic ulcer disease or GERD.  He feels fatigued and weak, short of breath but no chest pain. He feels dehydrated at this time.  No shortness of breath, no chest pain.  No fevers.  No history of abdominal surgery    PAST MEDICAL HISTORY: History reviewed. No pertinent past medical history.    PAST SURGICAL HISTORY: No past surgical history on file.    Past medical history, past surgical history, medications, and allergies were reviewed with the patient. Additional pertinent items: None    FAMILY HISTORY: No family history on file.    SOCIAL HISTORY:   Social History     Tobacco Use     Smoking status: Never Smoker     Smokeless tobacco: Never Used   Substance Use Topics     Alcohol use: No     Social history was reviewed with the patient. Additional pertinent items: None      Current Discharge Medication List      CONTINUE these medications which have NOT CHANGED    Details   carboxymethylcellulose PF (CARBOXYMETHYLCELLULOSE SODIUM) 0.5 % ophthalmic solution Place 1  drop into both eyes 4 times daily  Qty: 1 mL, Refills: 11    Associated Diagnoses: Dry eye syndrome of both eyes      ferrous sulfate (FEROSUL) 325 (65 Fe) MG tablet Take 1 tablet (325 mg) by mouth daily (with breakfast)  Qty: 90 tablet, Refills: 3    Associated Diagnoses: Iron deficiency anemia secondary to inadequate dietary iron intake      guaiFENesin (MUCINEX) 600 MG 12 hr tablet Take 1 tablet (600 mg) by mouth 2 times daily as needed for congestion  Qty: 20 tablet, Refills: 0    Associated Diagnoses: Cough; Nasal congestion      ibuprofen (ADVIL/MOTRIN) 600 MG tablet Take 1 tablet (600 mg) by mouth every 8 hours as needed for moderate pain  Qty: 20 tablet, Refills: 0      permethrin (ELIMITE) 5 % external cream Apply cream from head to toe (except the face); leave on for 8-14 hours then wash off with water; reapply in 1 week if live mites appear.  Qty: 60 g, Refills: 3    Associated Diagnoses: Scabies infestation              No Known Allergies     Review of Systems   Constitutional: Positive for fatigue. Negative for appetite change and fever.   HENT: Negative for sore throat.    Eyes: Negative for visual disturbance.   Respiratory: Negative for shortness of breath.    Cardiovascular: Negative for chest pain.   Gastrointestinal: Positive for abdominal pain, diarrhea and nausea. Negative for vomiting.   Genitourinary: Negative for dysuria.   Skin: Negative for rash.   Allergic/Immunologic: Negative for immunocompromised state.   Neurological: Positive for weakness and light-headedness. Negative for seizures, syncope and headaches.   Hematological: Does not bruise/bleed easily.   Psychiatric/Behavioral: Positive for decreased concentration and dysphoric mood.   All other systems reviewed and are negative.    A complete review of systems was performed with pertinent positives and negatives noted in the HPI, and all other systems negative.    Physical Exam   BP: 102/72  Pulse: 91  Temp: 99.7  F (37.6  C)  Resp:  18  SpO2: 99 %      Physical Exam  Vitals signs and nursing note reviewed.   Constitutional:       General: He is in acute distress.      Appearance: He is well-developed. He is ill-appearing. He is not toxic-appearing or diaphoretic.      Comments: Mild sx standing fatigue only   HENT:      Head: Normocephalic and atraumatic.      Mouth/Throat:      Mouth: Mucous membranes are dry.   Eyes:      General: No scleral icterus.     Extraocular Movements: Extraocular movements intact.      Conjunctiva/sclera: Conjunctivae normal.      Pupils: Pupils are equal, round, and reactive to light.   Neck:      Musculoskeletal: Normal range of motion and neck supple.   Cardiovascular:      Rate and Rhythm: Normal rate.   Pulmonary:      Effort: No respiratory distress.      Breath sounds: Rales present. Wheezes: left base.   Abdominal:      General: There is no distension.      Palpations: There is no mass.      Tenderness: There is abdominal tenderness (epigastric). There is no guarding.      Hernia: No hernia is present.   Musculoskeletal:         General: No swelling or tenderness.      Right lower leg: No edema.      Left lower leg: No edema.   Skin:     General: Skin is warm and dry.      Capillary Refill: Capillary refill takes less than 2 seconds.      Coloration: Skin is not jaundiced.      Findings: No rash.   Neurological:      General: No focal deficit present.      Mental Status: He is alert and oriented to person, place, and time. Mental status is at baseline.   Psychiatric:      Comments: Flat but appropriate         ED Course   Patient valuated in the ER using Covid precautions.  IV established labs drawn patient received 2 L normal saline.  Patient's laboratory testing reveal sodium 126.  Potassium 4.1 chloride is 93.  Glucose 103.  Troponin negative.  BNP negative  EKG without hyperacute changes.  Chest x-ray shows questionable left lower lobe infiltrate.  Patient oxygen saturation maintained stable here in the  ER.  Lipase 192.  Liver function test within normal limits.  White count 5.2.  Hemoglobin 12.8.  Patient did receive Zithromax and ceftriaxone for pretty acquired pneumonia findings.  Patient given Tylenol for fever which spiked to 102.  Continue reassess patient in the ER patient is otherwise been stable.      Discussed with triage doc will plan to admit to the [place] [facility] for hyponatremia pneumonia Covid diagnosis.    Patient understands agrees stable.     Procedures                EKG Interpretation:      Interpreted by Favio Lowe MD  Time reviewed:1238   Symptoms at time of EKG: nausea and weakness   Rhythm: normal sinus   Rate: normal  Axis: NORMAL  Ectopy: none  Conduction: normal  ST Segments/ T Waves: No ST-T wave changes  Q Waves: none  Comparison to prior: No old EKG available    Clinical Impression: normal EKG                    Results for orders placed or performed during the hospital encounter of 11/23/20 (from the past 24 hour(s))   CBC with platelets differential   Result Value Ref Range    WBC 5.2 4.0 - 11.0 10e9/L    RBC Count 3.87 (L) 4.4 - 5.9 10e12/L    Hemoglobin 12.8 (L) 13.3 - 17.7 g/dL    Hematocrit 37.1 (L) 40.0 - 53.0 %    MCV 96 78 - 100 fl    MCH 33.1 (H) 26.5 - 33.0 pg    MCHC 34.5 31.5 - 36.5 g/dL    RDW 11.9 10.0 - 15.0 %    Platelet Count 174 150 - 450 10e9/L    Diff Method Automated Method     % Neutrophils 70.6 %    % Lymphocytes 17.3 %    % Monocytes 11.3 %    % Eosinophils 0.0 %    % Basophils 0.2 %    % Immature Granulocytes 0.6 %    Nucleated RBCs 0 0 /100    Absolute Neutrophil 3.7 1.6 - 8.3 10e9/L    Absolute Lymphocytes 0.9 0.8 - 5.3 10e9/L    Absolute Monocytes 0.6 0.0 - 1.3 10e9/L    Absolute Eosinophils 0.0 0.0 - 0.7 10e9/L    Absolute Basophils 0.0 0.0 - 0.2 10e9/L    Abs Immature Granulocytes 0.0 0 - 0.4 10e9/L    Absolute Nucleated RBC 0.0    INR   Result Value Ref Range    INR 1.02 0.86 - 1.14   Partial thromboplastin time   Result Value Ref Range    PTT 38 (H)  22 - 37 sec   Comprehensive metabolic panel   Result Value Ref Range    Sodium 126 (L) 133 - 144 mmol/L    Potassium 4.1 3.4 - 5.3 mmol/L    Chloride 93 (L) 94 - 109 mmol/L    Carbon Dioxide 28 20 - 32 mmol/L    Anion Gap 5 3 - 14 mmol/L    Glucose 103 (H) 70 - 99 mg/dL    Urea Nitrogen 7 7 - 30 mg/dL    Creatinine 0.80 0.66 - 1.25 mg/dL    GFR Estimate >90 >60 mL/min/[1.73_m2]    GFR Estimate If Black >90 >60 mL/min/[1.73_m2]    Calcium 8.1 (L) 8.5 - 10.1 mg/dL    Bilirubin Total 0.5 0.2 - 1.3 mg/dL    Albumin 3.5 3.4 - 5.0 g/dL    Protein Total 7.6 6.8 - 8.8 g/dL    Alkaline Phosphatase 76 40 - 150 U/L    ALT 26 0 - 70 U/L    AST 35 0 - 45 U/L   Lipase   Result Value Ref Range    Lipase 192 73 - 393 U/L   Troponin I   Result Value Ref Range    Troponin I ES <0.015 0.000 - 0.045 ug/L   Nt probnp inpatient (BNP)   Result Value Ref Range    N-Terminal Pro BNP Inpatient 33 0 - 900 pg/mL   CRP inflammation   Result Value Ref Range    CRP Inflammation 76.0 (H) 0.0 - 8.0 mg/L   EKG 12-lead, tracing only   Result Value Ref Range    Interpretation ECG Click View Image link to view waveform and result    XR Chest Port 1 View    Narrative    CHEST PORTABLE ONE VIEW   11/23/2020 1:13 PM     HISTORY: COVID positive and nausea and shortness of breath.    COMPARISON: None.      Impression    IMPRESSION: Heart size is normal. Pulmonary vasculature is normal.  Right lung appears clear. Possible mild early patchy peripheral  opacity in the left lower lobe. This could be related to early  infiltrate/pneumonia.    ISMA ACEVEDO MD   Sodium   Result Value Ref Range    Sodium 132 (L) 133 - 144 mmol/L   Procalcitonin   Result Value Ref Range    Procalcitonin <0.05 ng/ml     Medications   lidocaine 1 % 0.1-1 mL (has no administration in time range)   lidocaine (LMX4) cream (has no administration in time range)   sodium chloride (PF) 0.9% PF flush 3 mL (has no administration in time range)   sodium chloride (PF) 0.9% PF flush 3 mL (3 mLs  Intracatheter Not Given 11/23/20 2019)   dextrose 5% and 0.9% NaCl infusion ( Intravenous Rate/Dose Change 11/23/20 2013)   ibuprofen (ADVIL/MOTRIN) tablet 600 mg (600 mg Oral Given 11/23/20 2015)   lidocaine 1 % 0.1-1 mL (has no administration in time range)   lidocaine (LMX4) cream (has no administration in time range)   sodium chloride (PF) 0.9% PF flush 3 mL (has no administration in time range)   sodium chloride (PF) 0.9% PF flush 3 mL (3 mLs Intracatheter Not Given 11/23/20 2018)   melatonin tablet 1 mg (has no administration in time range)   enoxaparin ANTICOAGULANT (LOVENOX) injection 40 mg (40 mg Subcutaneous Not Given 11/23/20 2014)   acetaminophen (TYLENOL) tablet 650 mg (has no administration in time range)   ondansetron (ZOFRAN-ODT) ODT tab 4 mg (has no administration in time range)     Or   ondansetron (ZOFRAN) injection 4 mg (has no administration in time range)   alum & mag hydroxide-simethicone (MAALOX) suspension 30 mL (has no administration in time range)   guaiFENesin-dextromethorphan (ROBITUSSIN DM) 100-10 MG/5ML syrup 10 mL (has no administration in time range)   0.9% sodium chloride BOLUS (0 mLs Intravenous Stopped 11/23/20 1413)   famotidine (PEPCID) infusion 20 mg (0 mg Intravenous Stopped 11/23/20 1625)   azithromycin (ZITHROMAX) 500 mg in  mL (500 mg Intravenous Given 11/23/20 1416)   cefTRIAXone (ROCEPHIN) 1 g vial to attach to  mL bag for ADULTS or NS 50 mL bag for PEDS (0 g Intravenous Stopped 11/23/20 1625)   0.9% sodium chloride BOLUS (0 mLs Intravenous Stopped 11/23/20 1625)   acetaminophen (TYLENOL) tablet 1,000 mg (1,000 mg Oral Given 11/23/20 1545)   alum & mag hydroxide-simethicone (MAALOX) suspension 30 mL (30 mLs Oral Given 11/23/20 1620)             Assessments & Plan (with Medical Decision Making)  55-year-old male diagnosed with Covid 5 days ago presents with ongoing weakness.  Patient has slight shortness of breath vital signs are stable though otherwise  throughout most the course with patient some transient lower blood pressure with fever.  Patient had sodium 126.  Left lower lobe pneumonia but oxygen saturation stable.  EKG cardiac work-up otherwise negative.  Patient treated with daily acquired pneumonia antibiotics.  IV fluids given as noted.  Patient to be admitted to the [facility] for ongoing management of generalized weakness dehydration recent Covid left lower lobe pneumonia hyponatremia.         I have reviewed the nursing notes.    I have reviewed the findings, diagnosis, plan and need for follow up with the patient.    Current Discharge Medication List          Final diagnoses:   2019 novel coronavirus disease (COVID-19)   Pneumonia of left lower lobe due to infectious organism   Hyponatremia   Febrile illness   Dehydration     I, Balbina Steve, am serving as a trained medical scribe to document services personally performed by Favio Lowe MD based on the provider's statements to me on November 23, 2020.  This document has been checked and approved by the attending provider.    I, Favio Lowe MD, was physically present and have reviewed and verified the accuracy of this note documented by Balbina Steve, medical scribe.       11/23/2020   [facility] EMERGENCY DEPARTMENT    This note was created at least in part by the use of dragon voice dictation system. Inadvertent typographical errors may still exist.  Favio Lowe MD.    Patient evaluated in the emergency department during the COVID-19 pandemic period. Careful attention to patients safety was addressed throughout the evaluation. Evaluation and treatment management was initiated with disposition made efficiently and appropriate as possible to minimize any risk of potential exposure to patient during this evaluation.       Favio Lowe MD  11/23/20 0596

## 2020-11-23 NOTE — H&P
[facility]     History and Physical - Hospitalist Service       Date of Admission:  11/23/2020    Assessment & Plan   Rony Pandya is a 55 year old male admitted on 11/23/2020.     Rony Pandya is a 55 year old male with a recent diagnosis of Covid 11/15, presents to ED for further evaluation of worsening generalized weakness and fatigue.  Other associated symptoms include intermittent fever, cough, nausea, loose stools, loss/change of taste.  Denies chest pain, shortness of breath.  Found to have dehydration, hyponatremia 126.    #Covid -19 infection: Positive on 11/15.   Symptoms as above.  No dyspnea, evidence of hypoxemia.   CXR: ? Possible mild early patchy peripheral opacity in the left lower lobe.  Otherwise no clear infiltrates.  Procalcitonin less than 0.05.  CRP 76.  White count 5.2.    -Continue supportive cares including IV hydration, acetaminophen, ibuprofen as needed.  Robitussin DM as needed.  Antiemetics as needed.  -No indication for dexamethasone or remdesivir at current.  -Continue to monitor hemodynamics closely.   -Covid special isolation  -Lovenox subcu for DVT prophylaxis  -Further disposition planning based on the clinical course.    #Hyponatremia: 126 on admission.  Improved to 132 after initial fluid resuscitation.  Likely secondary to dehydration, poor p.o. solute intake.  Continue hydration: IV/oral  Encourage oral solute intake    #No other medical concern.     Diet:   Orders Placed This Encounter      Combination Diet Regular Diet Adult    DVT Prophylaxis: Enoxaparin (Lovenox) SQ  Bautista Catheter: not present  Code Status:   full code.          Disposition Plan   Expected discharge: 1-2 days, recommended to tbd once based on clinical course. Home vs [facility]. SOC triage aware. Call in the am to update if needs transfer to [facility].   Entered: Antony Manuel MD 11/23/2020, 5:28 PM     The patient's care was discussed with the Patient and  RN.    Antony Manuel MD  [facility]   Contact information available via [facility] Paging/Directory      ______________________________________________________________________    Chief Complaint     Gen weakness  Fatigue.   COVID Positive: 11/15/20    History is obtained from the patient, electronic health record and emergency department physician    History of Present Illness     Rony Pandya is a 55 year old male with a recent diagnosis of Covid, presents to ED for further evaluation of generalized weakness and fatigue.    He had presented to [facility] urgent care on 11/15/2020 complaining of cough, generalized malaise, body aches, rhinorrhea and headache.  His Covid returned positive. Since that time he has been having intermittent fever, his headache has improved but he continues to have generalized body aches and now loss of sense of taste and smell.   Also continues to have cough -mostly dry , at times whitish sputum.  Reports unable to eat well given bitter test .  He has mild nausea but no vomiting. He also had a little diarrhea-couple episodes, nonbloody, this morning. He states that he has not had any history of peptic ulcer disease or GERD.  He feels fatigued and weak with difficulty to ambulate. He feels dehydrated at this time.   Denies chest pain, shortness of breath.  No lightheadedness or dizziness.    No dysuria.  No acute rash.   No other concern.  Denies fall.      Review of Systems    The 10 point Review of Systems is negative other than noted in the HPI or here.     Past Medical History    I have reviewed this patient's medical history and updated it with pertinent information if needed.   History reviewed. No pertinent past medical history.    Past Surgical History   I have reviewed this patient's surgical history and updated it with pertinent information if needed.    Reviewed.  No pertinent past surgical history.    Social History   I have  reviewed this patient's social history and updated it with pertinent information if needed.  Social History     Tobacco Use     Smoking status: Never Smoker     Smokeless tobacco: Never Used   Substance Use Topics     Alcohol use: No     Drug use: No       Family History   Reviewed.  No pertinent family history reported.      Prior to Admission Medications   Prior to Admission Medications   Prescriptions Last Dose Informant Patient Reported? Taking?   carboxymethylcellulose PF (CARBOXYMETHYLCELLULOSE SODIUM) 0.5 % ophthalmic solution   No No   Sig: Place 1 drop into both eyes 4 times daily   Patient not taking: Reported on 7/8/2020   ferrous sulfate (FEROSUL) 325 (65 Fe) MG tablet   No No   Sig: Take 1 tablet (325 mg) by mouth daily (with breakfast)   guaiFENesin (MUCINEX) 600 MG 12 hr tablet   No No   Sig: Take 1 tablet (600 mg) by mouth 2 times daily as needed for congestion   Patient not taking: Reported on 7/8/2020   ibuprofen (ADVIL/MOTRIN) 600 MG tablet   No No   Sig: Take 1 tablet (600 mg) by mouth every 8 hours as needed for moderate pain   Patient not taking: Reported on 7/8/2020   permethrin (ELIMITE) 5 % external cream   No No   Sig: Apply cream from head to toe (except the face); leave on for 8-14 hours then wash off with water; reapply in 1 week if live mites appear.      Facility-Administered Medications: None     Allergies   No Known Allergies    Physical Exam   Vital Signs: Temp: 100.9  F (38.3  C) Temp src: Oral BP: 94/53 Pulse: 83     SpO2: 100 %      Weight: 0 lbs 0 oz      Visit/Communication Style   VIRTUAL (VIDEO) communication was used to evaluate Turi due to the COVID pandemic.    Turi consented to the use of video communication: yes  Video START time: 5.29 pm, 11/23/2020  Video STOP time: , 5. 37pm 11/23/2020   Patient's location: [facility]    Provider's location during the visit: [facility]            Alert, no acute distress.  Nonlabored breathing.    Data   Data reviewed today: I reviewed all medications, new labs and imaging results over the last 24 hours. I personally reviewed no images or EKG's today.    Recent Labs   Lab 11/23/20 1943 11/23/20  1234   WBC  --  5.2   HGB  --  12.8*   MCV  --  96   PLT  --  174   INR  --  1.02   * 126*   POTASSIUM  --  4.1   CHLORIDE  --  93*   CO2  --  28   BUN  --  7   CR  --  0.80   ANIONGAP  --  5   SHAGGY  --  8.1*   GLC  --  103*   ALBUMIN  --  3.5   PROTTOTAL  --  7.6   BILITOTAL  --  0.5   ALKPHOS  --  76   ALT  --  26   AST  --  35   LIPASE  --  192   TROPI  --  <0.015     Recent Results (from the past 24 hour(s))   XR Chest Port 1 View    Narrative    CHEST PORTABLE ONE VIEW   11/23/2020 1:13 PM     HISTORY: COVID positive and nausea and shortness of breath.    COMPARISON: None.      Impression    IMPRESSION: Heart size is normal. Pulmonary vasculature is normal.  Right lung appears clear. Possible mild early patchy peripheral  opacity in the left lower lobe. This could be related to early  infiltrate/pneumonia.    ISMA ACEVEDO MD

## 2020-11-23 NOTE — ED NOTES
[facility]    ED Nurse to Floor Handoff     Rony Pandya is a 55 year old male who speaks Oromo and lives with others,  in a home  They arrived in the ED by car from home    ED Chief Complaint: Fatigue (positive for covid last week: some weakness; not eating much;thought it would be good to get some iv fluid)    ED Dx;   Final diagnoses:   2019 novel coronavirus disease (COVID-19)   Pneumonia of left lower lobe due to infectious organism   Hyponatremia   Febrile illness   Dehydration         Needed?: No    Allergies: No Known Allergies.  Past Medical Hx: History reviewed. No pertinent past medical history.   Baseline Mental status: WDL  Current Mental Status changes: at basesline    Infection present or suspected this encounter: yes respiratory  Sepsis suspected: No  Isolation type: Special Precautions-COVID-19  Patient tested for COVID 19 prior to admission: YES     Activity level - Baseline/Home:  Independent  Activity Level - Current:   Independent    Bariatric equipment needed?: No    In the ED these meds were given:   Medications   lidocaine 1 % 0.1-1 mL (has no administration in time range)   lidocaine (LMX4) cream (has no administration in time range)   sodium chloride (PF) 0.9% PF flush 3 mL (has no administration in time range)   sodium chloride (PF) 0.9% PF flush 3 mL (has no administration in time range)   0.9% sodium chloride BOLUS (0 mLs Intravenous Stopped 11/23/20 1413)     Followed by   sodium chloride 0.9% infusion ( Intravenous Not Given 11/23/20 1631)   ondansetron (ZOFRAN) injection 4 mg (has no administration in time range)   dextrose 5% and 0.9% NaCl infusion ( Intravenous Rate/Dose Verify 11/23/20 1742)   famotidine (PEPCID) infusion 20 mg (0 mg Intravenous Stopped 11/23/20 1625)   azithromycin (ZITHROMAX) 500 mg in  mL (500 mg Intravenous Given 11/23/20 1416)   cefTRIAXone (ROCEPHIN) 1 g vial to attach to  mL bag for  ADULTS or NS 50 mL bag for PEDS (0 g Intravenous Stopped 11/23/20 1625)   0.9% sodium chloride BOLUS (0 mLs Intravenous Stopped 11/23/20 1625)   acetaminophen (TYLENOL) tablet 1,000 mg (1,000 mg Oral Given 11/23/20 1545)   alum & mag hydroxide-simethicone (MAALOX) suspension 30 mL (30 mLs Oral Given 11/23/20 1620)       Drips running?  Yes    Home pump  No    Current LDAs  Peripheral IV 11/23/20 Right Upper forearm (Active)   Site Assessment WDL 11/23/20 1235   Line Status Saline locked 11/23/20 1235   Dressing Intervention New dressing  11/23/20 1235   Phlebitis Scale 0-->no symptoms 11/23/20 1235   Infiltration Scale 0 11/23/20 1235   Infiltration Site Treatment Method  None 11/23/20 1235   Number of days: 0       Labs results:   Labs Ordered and Resulted from Time of ED Arrival Up to the Time of Departure from the ED   CBC WITH PLATELETS DIFFERENTIAL - Abnormal; Notable for the following components:       Result Value    RBC Count 3.87 (*)     Hemoglobin 12.8 (*)     Hematocrit 37.1 (*)     MCH 33.1 (*)     All other components within normal limits   PARTIAL THROMBOPLASTIN TIME - Abnormal; Notable for the following components:    PTT 38 (*)     All other components within normal limits   COMPREHENSIVE METABOLIC PANEL - Abnormal; Notable for the following components:    Sodium 126 (*)     Chloride 93 (*)     Glucose 103 (*)     Calcium 8.1 (*)     All other components within normal limits   INR   LIPASE   TROPONIN I   NT PROBNP INPATIENT   CRP INFLAMMATION   SODIUM   PULSE OXIMETRY NURSING   PERIPHERAL IV CATHETER       Imaging Studies:   Recent Results (from the past 24 hour(s))   XR Chest Port 1 View    Narrative    CHEST PORTABLE ONE VIEW   11/23/2020 1:13 PM     HISTORY: COVID positive and nausea and shortness of breath.    COMPARISON: None.      Impression    IMPRESSION: Heart size is normal. Pulmonary vasculature is normal.  Right lung appears clear. Possible mild early patchy peripheral  opacity in the left  lower lobe. This could be related to early  infiltrate/pneumonia.    ISMA ACEVEDO MD       Recent vital signs:   BP 94/53   Pulse 83   Temp 100.9  F (38.3  C) (Oral)   SpO2 100%             Cardiac Rhythm: Normal Sinus  Pt needs tele? No  Skin/wound Issues: None    Code Status: Full Code    Pain control: good    Nausea control: good    Abnormal labs/tests/findings requiring intervention:     Family present during ED course? No   Family Comments/Social Situation comments:     Tasks needing completion: None    Nola Salcido RN  [facility] -- [phone] [facility]  [phone] [facility]

## 2020-11-23 NOTE — TELEPHONE ENCOUNTER
Patient states he is positive Covid 19 and unable to eat as he is having difficulty swallowing and is very weak.  Wants to know if [facility] ED is open.

## 2020-11-24 ENCOUNTER — PATIENT OUTREACH (OUTPATIENT)
Dept: CARE COORDINATION | Facility: CLINIC | Age: 55
End: 2020-11-24

## 2020-11-24 ENCOUNTER — DOCUMENTATION ONLY (OUTPATIENT)
Dept: MEDSURG UNIT | Facility: CLINIC | Age: 55
End: 2020-11-24

## 2020-11-24 VITALS
RESPIRATION RATE: 16 BRPM | TEMPERATURE: 99.4 F | DIASTOLIC BLOOD PRESSURE: 80 MMHG | OXYGEN SATURATION: 100 % | SYSTOLIC BLOOD PRESSURE: 144 MMHG | HEART RATE: 69 BPM

## 2020-11-24 LAB
ANION GAP SERPL CALCULATED.3IONS-SCNC: 3 MMOL/L (ref 3–14)
BUN SERPL-MCNC: 5 MG/DL (ref 7–30)
CALCIUM SERPL-MCNC: 7.7 MG/DL (ref 8.5–10.1)
CHLORIDE SERPL-SCNC: 105 MMOL/L (ref 94–109)
CO2 SERPL-SCNC: 26 MMOL/L (ref 20–32)
CREAT SERPL-MCNC: 0.76 MG/DL (ref 0.66–1.25)
CRP SERPL-MCNC: 80 MG/L (ref 0–8)
ERYTHROCYTE [DISTWIDTH] IN BLOOD BY AUTOMATED COUNT: 12.1 % (ref 10–15)
GFR SERPL CREATININE-BSD FRML MDRD: >90 ML/MIN/{1.73_M2}
GLUCOSE SERPL-MCNC: 97 MG/DL (ref 70–99)
HCT VFR BLD AUTO: 33.9 % (ref 40–53)
HGB BLD-MCNC: 11.6 G/DL (ref 13.3–17.7)
MCH RBC QN AUTO: 33.6 PG (ref 26.5–33)
MCHC RBC AUTO-ENTMCNC: 34.2 G/DL (ref 31.5–36.5)
MCV RBC AUTO: 98 FL (ref 78–100)
PLATELET # BLD AUTO: 163 10E9/L (ref 150–450)
POTASSIUM SERPL-SCNC: 4.2 MMOL/L (ref 3.4–5.3)
RBC # BLD AUTO: 3.45 10E12/L (ref 4.4–5.9)
SODIUM SERPL-SCNC: 134 MMOL/L (ref 133–144)
WBC # BLD AUTO: 4.6 10E9/L (ref 4–11)

## 2020-11-24 PROCEDURE — 258N000003 HC RX IP 258 OP 636: Performed by: INTERNAL MEDICINE

## 2020-11-24 PROCEDURE — 250N000013 HC RX MED GY IP 250 OP 250 PS 637: Performed by: INTERNAL MEDICINE

## 2020-11-24 PROCEDURE — 86140 C-REACTIVE PROTEIN: CPT | Performed by: INTERNAL MEDICINE

## 2020-11-24 PROCEDURE — 99239 HOSP IP/OBS DSCHRG MGMT >30: CPT | Mod: 95 | Performed by: HOSPITALIST

## 2020-11-24 PROCEDURE — 80048 BASIC METABOLIC PNL TOTAL CA: CPT | Performed by: INTERNAL MEDICINE

## 2020-11-24 PROCEDURE — 85027 COMPLETE CBC AUTOMATED: CPT | Performed by: INTERNAL MEDICINE

## 2020-11-24 PROCEDURE — 36415 COLL VENOUS BLD VENIPUNCTURE: CPT | Performed by: INTERNAL MEDICINE

## 2020-11-24 RX ORDER — ZINC GLUCONATE 50 MG
50 TABLET ORAL DAILY
Qty: 30 TABLET | Refills: 0 | Status: SHIPPED | OUTPATIENT
Start: 2020-11-24

## 2020-11-24 RX ORDER — IBUPROFEN 200 MG
400 TABLET ORAL EVERY 12 HOURS PRN
Status: ON HOLD | COMMUNITY
End: 2020-11-24

## 2020-11-24 RX ORDER — CEFUROXIME AXETIL 500 MG/1
500 TABLET ORAL 2 TIMES DAILY
Qty: 16 TABLET | Refills: 0 | Status: ON HOLD | OUTPATIENT
Start: 2020-11-24 | End: 2020-11-30

## 2020-11-24 RX ORDER — ACETAMINOPHEN 325 MG/1
650 TABLET ORAL EVERY 4 HOURS PRN
Start: 2020-11-24

## 2020-11-24 RX ORDER — AZITHROMYCIN 250 MG/1
250 TABLET, FILM COATED ORAL DAILY
Qty: 4 TABLET | Refills: 0 | Status: ON HOLD | OUTPATIENT
Start: 2020-11-24 | End: 2020-11-30

## 2020-11-24 RX ORDER — CHOLECALCIFEROL (VITAMIN D3) 50 MCG
1 TABLET ORAL DAILY
Qty: 30 TABLET | Refills: 0 | Status: SHIPPED | OUTPATIENT
Start: 2020-11-24

## 2020-11-24 RX ORDER — ZINC GLUCONATE 50 MG
50 TABLET ORAL DAILY
Status: ON HOLD | COMMUNITY
End: 2020-11-24

## 2020-11-24 RX ADMIN — ACETAMINOPHEN 650 MG: 325 TABLET, FILM COATED ORAL at 10:03

## 2020-11-24 RX ADMIN — DEXTROSE AND SODIUM CHLORIDE: 5; 900 INJECTION, SOLUTION INTRAVENOUS at 06:44

## 2020-11-24 RX ADMIN — IBUPROFEN 600 MG: 600 TABLET, FILM COATED ORAL at 08:47

## 2020-11-24 NOTE — PROGRESS NOTES
VS: Pt had a temperature of 100.7 upon arrival to unit at 1930. Otherwise VSS. No complaints of SOB or chest pain      O2: >90% on room air.      Output: Voiding adequately without difficulty in urinal     Last BM: Bowel sounds active, pt passing gas     Activity: Independent in room          Skin: Skin is intact other than IV site     Pain: Pt reports no pain. Just uncomfortable from fever. Reports chills and generalized fatigue     CMS: Intact. No numbness or tingling     Dressing: Na      Diet: Pt has a regular adult diet. Pt revealed he is a vegetarian     Pt states he lacks appetite due to covid-19 and not having any taste or smell     LDA: PIV in R AC. Infusing well. Informed pt to keep arm straight as much as he could so the IV pump doesn't continue to beep at him     Equipment: IV pole, personal belongings (in pt closet in the room)     Plan: TBD     Additional Info: Pt does not need an  to communicate.     Giving tylenol and Advil PRN for fevers.     IV fluid running at 125. Sodium levels came back at 132 after receiving Bolus and IV fluids.     Pt expressed concerns about his living arrangements and not spreading Covid-19 to his family since they live in an apartment.

## 2020-11-24 NOTE — PLAN OF CARE
VS: BP (!) 144/80   Pulse 69   Temp 99.4  F (37.4  C) (Oral)   Resp 16   SpO2 100%    O2: >90% on room air, intermittent dry cough. Denies SOB/NV   Output: Voiding spontaneously    Last BM: 11/23/2020   Activity: Up ad marie in room    Skin: Intact    Pain: C/o lower back pain, PRN tylenol and warm pack given with some relief    CMS: Intact    Dressing: NA   Diet: Regular. Appetite fair.    LDA: PIV to right AC saline locked    Equipment: IV pole/pump, IPAD, call light within patient reach    Plan: To discharge to home today    Additional Info:

## 2020-11-24 NOTE — PLAN OF CARE
VS: Vitals stable   O2: Room air   Output: Voiding without difficulty using urinal   Last BM: unknown   Activity: independent   Skin: intact   Pain: Denied pain   CMS: intact   Dressing: none   Diet: Regular but poor appetite   LDA: PIV in right AC   Equipment: IV pole   Plan: Alternate Ibuprofen and Tylenol for intermittent fever/general malaise   Additional Info: Encouraged the use of IS.

## 2020-11-24 NOTE — PHARMACY-ADMISSION MEDICATION HISTORY
Admission Medication History Completed by Pharmacy    See [facility] Admission Navigator for allergy information, preferred outpatient pharmacy, prior to admission medications and immunization status.     Medication History Sources:     Patient    Pharmacy fill history via [facility]    Changes made to PTA medication list (reason):    Added: zinc (per pt)    Deleted: carboxymethylcellulose eye drops, ferrous sulfate, guaifenesin tablet, permethrin cream (not taking per pt)    Changed:   o Ibuprofen 600 mg q8 prn pain --> 400 mg q12 prn pain (per pt, takes OTC)    Additional Information:    Pt was prescribed 50,000 units vitamin D weekly x4 doses on 9/21/20, pt was told to continue taking vitamin D after this but he is not currently taking any.    Pt stated he is supposed to be taking ferrous sulfate but isn't currently taking any.    Prior to Admission medications    Medication Sig Last Dose Taking? Auth Provider   ibuprofen (ADVIL/MOTRIN) 200 MG tablet Take 400 mg by mouth every 12 hours as needed for mild pain Past Week at Unknown time Yes Unknown, Entered By History   zinc gluconate 50 MG tablet Take 50 mg by mouth daily Past Week at Unknown time Yes Unknown, Entered By History       Date completed: 11/24/20    Medication history completed by: Malaika Dickerson [facility]

## 2020-11-24 NOTE — DISCHARGE SUMMARY
[facility]  Discharge Summary    Rony Pandya MRN# [id number]   YOB: 1965 Age: 55 year old     Date of Admission:  11/23/2020  Date of Discharge:  No discharge date for patient encounter.  Admitting Physician:  César Hu MD  Discharge Physician:  Chris Helms MD  Discharging Service:  Internal Medicine     Primary Provider: Arlin Newman              Discharge Diagnosis:     Primary Discharge Diagnosis:     # Covid -19 infection: Positive on 11/15. .  # Left lower lobe pneumonia, from COVID-19 infection.   # Hyponatremia: 126 on admission.  Improved to 132 after initial fluid resuscitation.               Discharge Medications:     Current Discharge Medication List      START taking these medications    Details   acetaminophen (TYLENOL) 325 MG tablet Take 2 tablets (650 mg) by mouth every 4 hours as needed for mild pain    Associated Diagnoses: 2019 novel coronavirus disease (COVID-19)      azithromycin (ZITHROMAX) 250 MG tablet Take 1 tablet (250 mg) by mouth daily for 4 days  Qty: 4 tablet, Refills: 0    Associated Diagnoses: Pneumonia of left lower lobe due to infectious organism      cefuroxime (CEFTIN) 500 MG tablet Take 1 tablet (500 mg) by mouth 2 times daily for 8 days  Qty: 16 tablet, Refills: 0    Associated Diagnoses: Pneumonia of left lower lobe due to infectious organism      rivaroxaban ANTICOAGULANT (XARELTO) 10 MG TABS tablet Take 1 tablet (10 mg) by mouth daily (with dinner) 15 mg twice daily with food for 3 weeks, then 20 mg once daily with food  Qty: 30 tablet, Refills: 0    Comments: Future refills by primary care physician or cardiologist  Associated Diagnoses: 2019 novel coronavirus disease (COVID-19)      vitamin C w/VITALY HIPS 500 MG tablet Take 1 tablet (500 mg) by mouth 2 times daily  Qty: 60 tablet, Refills: 0    Associated Diagnoses: 2019 novel coronavirus disease (COVID-19)      vitamin D3 (CHOLECALCIFEROL) 50 mcg (2000 units) tablet  Take 1 tablet (50 mcg) by mouth daily  Qty: 30 tablet, Refills: 0    Associated Diagnoses: 2019 novel coronavirus disease (COVID-19)         CONTINUE these medications which have CHANGED    Details   zinc gluconate 50 MG tablet Take 1 tablet (50 mg) by mouth daily  Qty: 30 tablet, Refills: 0    Associated Diagnoses: 2019 novel coronavirus disease (COVID-19)         STOP taking these medications       ibuprofen (ADVIL/MOTRIN) 200 MG tablet Comments:   Reason for Stopping:         ibuprofen (ADVIL/MOTRIN) 600 MG tablet Comments:   Reason for Stopping:                    Hospital Course:   Rony Pandya is a 55 year old male with a recent diagnosis of Covid 11/15, presents to ED for further evaluation of worsening generalized weakness and fatigue.  Other associated symptoms include intermittent fever, cough, nausea, loose stools, loss/change of taste.  Denies chest pain, shortness of breath.  Found to have dehydration, hyponatremia 126. CXR on admission noted to have left lower lobe infiltrate.      # Covid -19 infection: Positive on 11/15.   -No dyspnea, evidence of hypoxemia.   -CXR: ? Possible mild early patchy peripheral opacity in the left lower lobe.  Otherwise no clear infiltrates.  Procalcitonin less than 0.05.  CRP 76.  White count 5.2. It could be from Covid itself, but can not completely rule out.      -Pt got supportive care including IVF, acetaminophen as needed.  Robitussin DM as needed.   -he does not have much respiratory sympotms. He has mild cough. He had fever when he came in. After IVF and supportive care, he is feeling better. His Na is now corrected. He feels great. Diarrhea is better. He wants to discharge home.   -He is started on Rocephin and azithromycin. We will continue ab for total 10 days as cefuroxime and azithromycin.   -No indication for dexamethasone or remdesivir at current.  -For DVT prophylaxis he is given lovenox here. We will give him xarelto at discharge. In addition he is  started on vit d and vit c and zinc.   -He is asked to quarantine. COVID discharge instructions given  -He is supposed to seek medical help, if he worsens  -Risk and benefits of Anticoagulation, Ab explained.      # Hyponatremia: 126 on admission.  Improved to 132 => 134 after initial fluid resuscitation.  Likely secondary to dehydration, poor p.o. solute intake. He reports drinking too much plain water only normally. He is aked to drink mixed fluids for 80 Oz per day                  Discharge Disposition:   Discharged to home           Condition on Discharge:   Discharge condition: Stable   Code status on discharge: Full Code           Procedures:   none          Consultations:   Consultation during this admission received from none.               Final Day of Progress before Discharge:       Physical Exam:  Blood pressure (!) 144/80, pulse 69, temperature 99.4  F (37.4  C), temperature source Oral, resp. rate 16, SpO2 100 %.    Video-Visit Details    Type of service:  Video Visit    Video Start Time (time video started): 1pm    Video End Time (time video stopped): 135pm    Originating Location (pt. Location): Other [facility]    Distant Location (provider location):  [facility] SURG [facility]THOP[facility]     Mode of Communication:  Video Conference via [facility]    Physician has received verbal consent for a Video Visit from the patient? Yes  Physical exam not possible due to covid helath crisis.      Chris Helms MD         Data:  All laboratory data reviewed             Significant Results:     Results for orders placed or performed during the hospital encounter of 11/23/20   XR Chest Port 1 View     Status: None    Narrative    CHEST PORTABLE ONE VIEW   11/23/2020 1:13 PM     HISTORY: COVID positive and nausea and shortness of breath.    COMPARISON: None.      Impression    IMPRESSION: Heart size is normal. Pulmonary vasculature is normal.  Right lung appears clear. Possible mild early patchy  peripheral  opacity in the left lower lobe. This could be related to early  infiltrate/pneumonia.    ISMA ACEVEDO MD   CBC with platelets differential     Status: Abnormal   Result Value Ref Range    WBC 5.2 4.0 - 11.0 10e9/L    RBC Count 3.87 (L) 4.4 - 5.9 10e12/L    Hemoglobin 12.8 (L) 13.3 - 17.7 g/dL    Hematocrit 37.1 (L) 40.0 - 53.0 %    MCV 96 78 - 100 fl    MCH 33.1 (H) 26.5 - 33.0 pg    MCHC 34.5 31.5 - 36.5 g/dL    RDW 11.9 10.0 - 15.0 %    Platelet Count 174 150 - 450 10e9/L    Diff Method Automated Method     % Neutrophils 70.6 %    % Lymphocytes 17.3 %    % Monocytes 11.3 %    % Eosinophils 0.0 %    % Basophils 0.2 %    % Immature Granulocytes 0.6 %    Nucleated RBCs 0 0 /100    Absolute Neutrophil 3.7 1.6 - 8.3 10e9/L    Absolute Lymphocytes 0.9 0.8 - 5.3 10e9/L    Absolute Monocytes 0.6 0.0 - 1.3 10e9/L    Absolute Eosinophils 0.0 0.0 - 0.7 10e9/L    Absolute Basophils 0.0 0.0 - 0.2 10e9/L    Abs Immature Granulocytes 0.0 0 - 0.4 10e9/L    Absolute Nucleated RBC 0.0    INR     Status: None   Result Value Ref Range    INR 1.02 0.86 - 1.14   Partial thromboplastin time     Status: Abnormal   Result Value Ref Range    PTT 38 (H) 22 - 37 sec   Comprehensive metabolic panel     Status: Abnormal   Result Value Ref Range    Sodium 126 (L) 133 - 144 mmol/L    Potassium 4.1 3.4 - 5.3 mmol/L    Chloride 93 (L) 94 - 109 mmol/L    Carbon Dioxide 28 20 - 32 mmol/L    Anion Gap 5 3 - 14 mmol/L    Glucose 103 (H) 70 - 99 mg/dL    Urea Nitrogen 7 7 - 30 mg/dL    Creatinine 0.80 0.66 - 1.25 mg/dL    GFR Estimate >90 >60 mL/min/[1.73_m2]    GFR Estimate If Black >90 >60 mL/min/[1.73_m2]    Calcium 8.1 (L) 8.5 - 10.1 mg/dL    Bilirubin Total 0.5 0.2 - 1.3 mg/dL    Albumin 3.5 3.4 - 5.0 g/dL    Protein Total 7.6 6.8 - 8.8 g/dL    Alkaline Phosphatase 76 40 - 150 U/L    ALT 26 0 - 70 U/L    AST 35 0 - 45 U/L   Lipase     Status: None   Result Value Ref Range    Lipase 192 73 - 393 U/L   Troponin I     Status: None    Result Value Ref Range    Troponin I ES <0.015 0.000 - 0.045 ug/L   Nt probnp inpatient (BNP)     Status: None   Result Value Ref Range    N-Terminal Pro BNP Inpatient 33 0 - 900 pg/mL   CRP inflammation     Status: Abnormal   Result Value Ref Range    CRP Inflammation 76.0 (H) 0.0 - 8.0 mg/L   Sodium     Status: Abnormal   Result Value Ref Range    Sodium 132 (L) 133 - 144 mmol/L   Procalcitonin     Status: None   Result Value Ref Range    Procalcitonin <0.05 ng/ml   CBC with platelets     Status: Abnormal   Result Value Ref Range    WBC 4.6 4.0 - 11.0 10e9/L    RBC Count 3.45 (L) 4.4 - 5.9 10e12/L    Hemoglobin 11.6 (L) 13.3 - 17.7 g/dL    Hematocrit 33.9 (L) 40.0 - 53.0 %    MCV 98 78 - 100 fl    MCH 33.6 (H) 26.5 - 33.0 pg    MCHC 34.2 31.5 - 36.5 g/dL    RDW 12.1 10.0 - 15.0 %    Platelet Count 163 150 - 450 10e9/L   Basic metabolic panel     Status: Abnormal   Result Value Ref Range    Sodium 134 133 - 144 mmol/L    Potassium 4.2 3.4 - 5.3 mmol/L    Chloride 105 94 - 109 mmol/L    Carbon Dioxide 26 20 - 32 mmol/L    Anion Gap 3 3 - 14 mmol/L    Glucose 97 70 - 99 mg/dL    Urea Nitrogen 5 (L) 7 - 30 mg/dL    Creatinine 0.76 0.66 - 1.25 mg/dL    GFR Estimate >90 >60 mL/min/[1.73_m2]    GFR Estimate If Black >90 >60 mL/min/[1.73_m2]    Calcium 7.7 (L) 8.5 - 10.1 mg/dL   CRP inflammation     Status: Abnormal   Result Value Ref Range    CRP Inflammation 80.0 (H) 0.0 - 8.0 mg/L   EKG 12-lead, tracing only     Status: None   Result Value Ref Range    Interpretation ECG Click View Image link to view waveform and result       Recent Results (from the past 48 hour(s))   XR Chest Port 1 View    Narrative    CHEST PORTABLE ONE VIEW   11/23/2020 1:13 PM     HISTORY: COVID positive and nausea and shortness of breath.    COMPARISON: None.      Impression    IMPRESSION: Heart size is normal. Pulmonary vasculature is normal.  Right lung appears clear. Possible mild early patchy peripheral  opacity in the left lower lobe.  This could be related to early  infiltrate/pneumonia.    ISMA ACEVEDO MD                Pending Results:   Unresulted Labs Ordered in the Past 30 Days of this Admission     No orders found for last 31 day(s).                  Discharge Instructions and Follow-Up:     Discharge Procedure Orders   Reason for your hospital stay   Order Comments: COVID-19 infection     Adult [facility] Follow-up and recommended labs and tests   Order Comments: Follow up with primary care provider, Arlin Newman, within 7 days for hospital follow- up.  No follow up labs or test are needed.      Appointments on [place] and/or [facility] (with [facility] or [facility] provider or service). Call [phone] if you haven't heard regarding these appointments within 7 days of discharge.     Activity   Order Comments: Your activity upon discharge: activity as tolerated     Order Specific Question Answer Comments   Is discharge order? Yes      Diet   Order Comments: Follow this diet upon discharge: Orders Placed This Encounter      Combination Diet Regular Diet Adult     Order Specific Question Answer Comments   Is discharge order? Yes             Chris Helms MD  Internal Medicine Staff Hospitalist  [phone]  November 24, 2020        Time spent on patient: 35 minutes total including face to face and coordinating care time reviewing current illness, any medication changes, and the care plan for today.

## 2020-11-24 NOTE — PROGRESS NOTES
Prior Authorization **INITIATED**    Medication: Eliquis 2.5mg tabs  Insurance Company: [employer] - Phone [phone] Fax [phone]  Pharmacy Filling the Rx: [place], [place] - [address]  Filling Pharmacy Phone: [phone]  Filling Pharmacy Fax: [phone]  Start Date: 11/24/2020  Reference #: CoverMyMeds Key: [id number] - [place] Case ID: [id number]  Comments:  Plan prefers Xarelto, Pradaxa, or Warfarin for oral alts. No trial history or known contraindications to preferred meds--likely to be denied.    Addendum 11/24/20 3:03pm: Dr Helms will send new script for Xarelto to pharmacy.    Mary Martel CPhT  [place] Discharge Pharmacy Liaison  Pronouns: She/Her/Hers    [facility]  [address]  [address], [place] [address]   [email or web address]  [email or web address]   Phone: [phone]  Pager: [phone]  Fax: [phone]

## 2020-11-24 NOTE — PROGRESS NOTES
Pt was given discharge medications and discharge and follow up instructions and states no further questions at this time. Pt was discharged to home via transport.

## 2020-11-24 NOTE — DISCHARGE INSTRUCTIONS
"Do  Not take ibuprofen or ASA or other nsaids while on apixaban due to risk of bleeding.   Discharge Instructions for COVID-19 Patients  You have--or may have--COVID-19. Please follow the instructions listed below.   If you have a weakened immune system, discuss with your doctor any other actions you need to take.  How can I protect others?  If you have symptoms (fever, cough, body aches or trouble breathing):    Stay home and away from others (self-isolate) until:  ? At least 10 days have passed since your symptoms started, And   ? You've had no fever--and no medicine that reduces fever--for 1 full day (24 hours), And    ? Your other symptoms have resolved (gotten better).  If you don't show symptoms, but testing showed that you have COVID-19:    Stay home and away from others (self-isolate). Follow the tips under \"How do I self-isolate?\" below for 10 days (20 days if you have a weak immune system).    You don't need to be retested for COVID-19 before going back to school or work. As long as you're fever-free and feeling better, you can go back to school, work and other activities after waiting the 10 or 20 days.   How do I self-isolate?    Stay in your own room, even for meals. Use your own bathroom if you can.    Stay away from others in your home. No hugging, kissing or shaking hands. No visitors.    Don't go to work, school or anywhere else.    Clean \"high touch\" surfaces often (doorknobs, counters, handles). Use household cleaning spray or wipes. You'll find a full list of  on the EPA website: www.epa.gov/pesticide-registration/list-n-disinfectants-use-against-sars-cov-2.    Cover your mouth and nose with a mask or other face covering to avoid spreading germs.    Wash your hands and face often. Use soap and water.    Caregivers in these groups are at risk for severe illness due to COVID-19:  ? People 65 years and older  ? People who live in a nursing home or long-term care facility  ? People with " chronic disease (lung, heart, cancer, diabetes, kidney, liver, immunologic)  ? People who have a weakened immune system, including those who:    Are in cancer treatment    Take medicine that weakens the immune system, such as corticosteroids    Had a bone marrow or organ transplant    Have an immune deficiency    Have poorly controlled HIV or AIDS    Are obese (body mass index of 40 or higher)    Smoke regularly    Caregivers should wear gloves while washing dishes, handling laundry and cleaning bedrooms and bathrooms.    Use caution when washing and drying laundry: Don't shake dirty laundry and use the warmest water setting that you can.    For more tips on managing your health at home, go to www.cdc.gov/coronavirus/2019-ncov/downloads/10Things.pdf.  How can I take care of myself at home?  1. Get lots of rest. Drink extra fluids (unless a doctor has told you not to).    2. Take Tylenol (acetaminophen) for fever or pain. If you have liver or kidney problems, ask your family doctor if it's okay to take Tylenol.     Adults can take either:  ? 650 mg (two 325 mg pills) every 4 to 6 hours, or   ? 1,000 mg (two 500 mg pills) every 8 hours as needed.  ? Note: Don't take more than 3,000 mg in one day. Acetaminophen is found in many medicines (both prescribed and over-the-counter medicines). Read all labels to be sure you don't take too much.   For children, check the Tylenol bottle for the right dose. The dose is based on the child's age or weight.  3. If you have other health problems (like cancer, heart failure, an organ transplant or severe kidney disease): Call your specialty clinic if you don't feel better in the next 2 days.    4. Know when to call 911. Emergency warning signs include:  ? Trouble breathing or shortness of breath  ? Pain or pressure in the chest that doesn't go away  ? Feeling confused like you haven't felt before, or not being able to wake up  ? Bluish-colored lips or face    5. Your doctor may have  prescribed a blood thinner medicine. Follow their instructions.  Where can I get more information?    [facility] - About COVID-19: [email or web address]    CDC - What to Do If You're Sick: www.cdc.gov/coronavirus/2019-ncov/about/steps-when-sick.html    CDC - Ending Home Isolation: www.cdc.gov/coronavirus/2019-ncov/hcp/disposition-in-home-patients.html    CDC - Caring for Someone: www.cdc.gov/coronavirus/2019-ncov/if-you-are-sick/care-for-someone.html    [facility] - Interim Guidance for Hospital Discharge to Home: www.health.[facility].[place].us/diseases/coronavirus/hcp/hospdischarge.pdf    [facility] clinical trials (COVID-19 research studies): clinicalaffairs.[facility]-clinical-trials    Below are the COVID-19 hotlines at the [place] Department of Health ([facility]). Interpreters are available.  ? For health questions: Call 381-305-4376 or 1-997.886.2003 (7 a.m. to 7 p.m.)  ? For questions about schools and childcare: Call 183-381-7315 or 1-682.434.4979 (7 a.m. to 7 p.m.)    For informational purposes only. Not to replace the advice of your health care provider. Clinically reviewed by the Infection Prevention Team. Copyright   2020 [place] [employer] [facility]. All rights reserved. [employer] [id number] - REV 08/04/20.

## 2020-11-25 NOTE — PROGRESS NOTES
Prior Authorization **DENIED**    Medication: Eliquis 2.5mg tabs **DENIED**  Denial Date: 11/24/2020  Reference #: CoverMyMeds Key: [id number] - [place] Case ID: [id number]  Denial Rational:   No trial of or contraindication to preferred alternatives tried. DX of DVT ppx related to Covid-19 also not covered.  Appeal Information:     Comments:  Plan prefers Xarelto, Pradaxa, or Warfarin for oral alts. No trial history or known contraindications to preferred meds. Patient changed to Xarelto 10mg tabs for discharge.        Mary Martel CPhT  [facility] Liaison  Pronouns: She/Her/Hers    [facility]  [address] [place] [address]  [address] [place], [place] [id number]   [email or web address]  [email or web address]   Phone: [phone]  Pager: [phone]  Fax: [phone]

## 2020-11-26 ENCOUNTER — APPOINTMENT (OUTPATIENT)
Dept: CT IMAGING | Facility: CLINIC | Age: 55
End: 2020-11-26
Attending: EMERGENCY MEDICINE
Payer: COMMERCIAL

## 2020-11-26 ENCOUNTER — APPOINTMENT (OUTPATIENT)
Dept: GENERAL RADIOLOGY | Facility: CLINIC | Age: 55
End: 2020-11-26
Attending: EMERGENCY MEDICINE
Payer: COMMERCIAL

## 2020-11-26 ENCOUNTER — HOSPITAL ENCOUNTER (INPATIENT)
Facility: CLINIC | Age: 55
LOS: 4 days | Discharge: HOME HEALTH | End: 2020-11-30
Attending: EMERGENCY MEDICINE | Admitting: INTERNAL MEDICINE
Payer: COMMERCIAL

## 2020-11-26 DIAGNOSIS — E87.6 HYPOKALEMIA: Primary | ICD-10-CM

## 2020-11-26 DIAGNOSIS — U07.1 2019 NOVEL CORONAVIRUS DISEASE (COVID-19): ICD-10-CM

## 2020-11-26 DIAGNOSIS — R06.02 SHORTNESS OF BREATH: ICD-10-CM

## 2020-11-26 DIAGNOSIS — E87.1 HYPONATREMIA: ICD-10-CM

## 2020-11-26 DIAGNOSIS — J18.9 PNEUMONIA OF LEFT LOWER LOBE DUE TO INFECTIOUS ORGANISM: ICD-10-CM

## 2020-11-26 LAB
ALBUMIN SERPL-MCNC: 2.7 G/DL (ref 3.4–5)
ALBUMIN UR-MCNC: NEGATIVE MG/DL
ALP SERPL-CCNC: 72 U/L (ref 40–150)
ALT SERPL W P-5'-P-CCNC: 33 U/L (ref 0–70)
ANION GAP SERPL CALCULATED.3IONS-SCNC: 8 MMOL/L (ref 3–14)
APPEARANCE UR: CLEAR
AST SERPL W P-5'-P-CCNC: 50 U/L (ref 0–45)
BASOPHILS # BLD AUTO: 0 10E9/L (ref 0–0.2)
BASOPHILS NFR BLD AUTO: 0.1 %
BILIRUB SERPL-MCNC: 0.7 MG/DL (ref 0.2–1.3)
BILIRUB UR QL STRIP: NEGATIVE
BUN SERPL-MCNC: 5 MG/DL (ref 7–30)
CALCIUM SERPL-MCNC: 8.2 MG/DL (ref 8.5–10.1)
CHLORIDE SERPL-SCNC: 91 MMOL/L (ref 94–109)
CO2 SERPL-SCNC: 26 MMOL/L (ref 20–32)
COLOR UR AUTO: ABNORMAL
CREAT SERPL-MCNC: 0.69 MG/DL (ref 0.66–1.25)
CREAT UR-MCNC: 19 MG/DL
CRP SERPL-MCNC: 180 MG/L (ref 0–8)
D DIMER PPP FEU-MCNC: 0.9 UG/ML FEU (ref 0–0.5)
DIFFERENTIAL METHOD BLD: ABNORMAL
EOSINOPHIL # BLD AUTO: 0 10E9/L (ref 0–0.7)
EOSINOPHIL NFR BLD AUTO: 0 %
ERYTHROCYTE [DISTWIDTH] IN BLOOD BY AUTOMATED COUNT: 11.9 % (ref 10–15)
GFR SERPL CREATININE-BSD FRML MDRD: >90 ML/MIN/{1.73_M2}
GLUCOSE SERPL-MCNC: 122 MG/DL (ref 70–99)
GLUCOSE UR STRIP-MCNC: NEGATIVE MG/DL
HCT VFR BLD AUTO: 32.9 % (ref 40–53)
HGB BLD-MCNC: 11.3 G/DL (ref 13.3–17.7)
HGB UR QL STRIP: ABNORMAL
IMM GRANULOCYTES # BLD: 0.1 10E9/L (ref 0–0.4)
IMM GRANULOCYTES NFR BLD: 1.2 %
INTERPRETATION ECG - MUSE: NORMAL
KETONES UR STRIP-MCNC: NEGATIVE MG/DL
LEUKOCYTE ESTERASE UR QL STRIP: NEGATIVE
LYMPHOCYTES # BLD AUTO: 0.5 10E9/L (ref 0.8–5.3)
LYMPHOCYTES NFR BLD AUTO: 4.9 %
MCH RBC QN AUTO: 32.6 PG (ref 26.5–33)
MCHC RBC AUTO-ENTMCNC: 34.3 G/DL (ref 31.5–36.5)
MCV RBC AUTO: 95 FL (ref 78–100)
MONOCYTES # BLD AUTO: 0.4 10E9/L (ref 0–1.3)
MONOCYTES NFR BLD AUTO: 4 %
MUCOUS THREADS #/AREA URNS LPF: PRESENT /LPF
NEUTROPHILS # BLD AUTO: 9.4 10E9/L (ref 1.6–8.3)
NEUTROPHILS NFR BLD AUTO: 89.8 %
NITRATE UR QL: NEGATIVE
NRBC # BLD AUTO: 0 10*3/UL
NRBC BLD AUTO-RTO: 0 /100
OSMOLALITY SERPL: 265 MMOL/KG (ref 275–295)
OSMOLALITY UR: 158 MMOL/KG (ref 100–1200)
PH UR STRIP: 7 PH (ref 5–7)
PLATELET # BLD AUTO: 214 10E9/L (ref 150–450)
POTASSIUM SERPL-SCNC: 3.7 MMOL/L (ref 3.4–5.3)
PROCALCITONIN SERPL-MCNC: 0.14 NG/ML
PROT SERPL-MCNC: 6.9 G/DL (ref 6.8–8.8)
RBC # BLD AUTO: 3.47 10E12/L (ref 4.4–5.9)
RBC #/AREA URNS AUTO: <1 /HPF (ref 0–2)
SODIUM SERPL-SCNC: 124 MMOL/L (ref 133–144)
SODIUM SERPL-SCNC: 130 MMOL/L (ref 133–144)
SODIUM UR-SCNC: 31 MMOL/L
SOURCE: ABNORMAL
SP GR UR STRIP: 1.01 (ref 1–1.03)
TROPONIN I SERPL-MCNC: <0.015 UG/L (ref 0–0.04)
TSH SERPL DL<=0.005 MIU/L-ACNC: 0.74 MU/L (ref 0.4–4)
UROBILINOGEN UR STRIP-MCNC: NORMAL MG/DL (ref 0–2)
WBC # BLD AUTO: 10.4 10E9/L (ref 4–11)
WBC #/AREA URNS AUTO: <1 /HPF (ref 0–5)

## 2020-11-26 PROCEDURE — 71275 CT ANGIOGRAPHY CHEST: CPT | Mod: 26 | Performed by: RADIOLOGY

## 2020-11-26 PROCEDURE — 87040 BLOOD CULTURE FOR BACTERIA: CPT | Performed by: INTERNAL MEDICINE

## 2020-11-26 PROCEDURE — 83930 ASSAY OF BLOOD OSMOLALITY: CPT | Performed by: NURSE PRACTITIONER

## 2020-11-26 PROCEDURE — 84145 PROCALCITONIN (PCT): CPT | Performed by: EMERGENCY MEDICINE

## 2020-11-26 PROCEDURE — 71275 CT ANGIOGRAPHY CHEST: CPT

## 2020-11-26 PROCEDURE — 84484 ASSAY OF TROPONIN QUANT: CPT | Performed by: EMERGENCY MEDICINE

## 2020-11-26 PROCEDURE — 82570 ASSAY OF URINE CREATININE: CPT | Performed by: NURSE PRACTITIONER

## 2020-11-26 PROCEDURE — 80053 COMPREHEN METABOLIC PANEL: CPT | Performed by: EMERGENCY MEDICINE

## 2020-11-26 PROCEDURE — 84300 ASSAY OF URINE SODIUM: CPT | Performed by: NURSE PRACTITIONER

## 2020-11-26 PROCEDURE — 99223 1ST HOSP IP/OBS HIGH 75: CPT | Mod: AI | Performed by: NURSE PRACTITIONER

## 2020-11-26 PROCEDURE — 250N000009 HC RX 250: Performed by: NURSE PRACTITIONER

## 2020-11-26 PROCEDURE — 99285 EMERGENCY DEPT VISIT HI MDM: CPT | Mod: 25 | Performed by: EMERGENCY MEDICINE

## 2020-11-26 PROCEDURE — 83935 ASSAY OF URINE OSMOLALITY: CPT | Performed by: NURSE PRACTITIONER

## 2020-11-26 PROCEDURE — 81001 URINALYSIS AUTO W/SCOPE: CPT | Performed by: NURSE PRACTITIONER

## 2020-11-26 PROCEDURE — 120N000002 HC R&B MED SURG/OB UMMC

## 2020-11-26 PROCEDURE — 71045 X-RAY EXAM CHEST 1 VIEW: CPT

## 2020-11-26 PROCEDURE — 250N000013 HC RX MED GY IP 250 OP 250 PS 637: Performed by: NURSE PRACTITIONER

## 2020-11-26 PROCEDURE — 96366 THER/PROPH/DIAG IV INF ADDON: CPT | Performed by: EMERGENCY MEDICINE

## 2020-11-26 PROCEDURE — 84443 ASSAY THYROID STIM HORMONE: CPT | Performed by: EMERGENCY MEDICINE

## 2020-11-26 PROCEDURE — 93005 ELECTROCARDIOGRAM TRACING: CPT | Performed by: EMERGENCY MEDICINE

## 2020-11-26 PROCEDURE — 84295 ASSAY OF SERUM SODIUM: CPT | Performed by: NURSE PRACTITIONER

## 2020-11-26 PROCEDURE — 36415 COLL VENOUS BLD VENIPUNCTURE: CPT | Performed by: INTERNAL MEDICINE

## 2020-11-26 PROCEDURE — 86140 C-REACTIVE PROTEIN: CPT | Performed by: NURSE PRACTITIONER

## 2020-11-26 PROCEDURE — 36415 COLL VENOUS BLD VENIPUNCTURE: CPT | Performed by: NURSE PRACTITIONER

## 2020-11-26 PROCEDURE — 258N000003 HC RX IP 258 OP 636: Performed by: EMERGENCY MEDICINE

## 2020-11-26 PROCEDURE — 250N000011 HC RX IP 250 OP 636: Performed by: EMERGENCY MEDICINE

## 2020-11-26 PROCEDURE — 85379 FIBRIN DEGRADATION QUANT: CPT | Performed by: EMERGENCY MEDICINE

## 2020-11-26 PROCEDURE — 96365 THER/PROPH/DIAG IV INF INIT: CPT | Performed by: EMERGENCY MEDICINE

## 2020-11-26 PROCEDURE — 250N000011 HC RX IP 250 OP 636: Performed by: NURSE PRACTITIONER

## 2020-11-26 PROCEDURE — 250N000013 HC RX MED GY IP 250 OP 250 PS 637: Performed by: EMERGENCY MEDICINE

## 2020-11-26 PROCEDURE — 96367 TX/PROPH/DG ADDL SEQ IV INF: CPT | Performed by: EMERGENCY MEDICINE

## 2020-11-26 PROCEDURE — 85025 COMPLETE CBC W/AUTO DIFF WBC: CPT | Performed by: EMERGENCY MEDICINE

## 2020-11-26 PROCEDURE — 250N000012 HC RX MED GY IP 250 OP 636 PS 637: Performed by: NURSE PRACTITIONER

## 2020-11-26 PROCEDURE — 258N000003 HC RX IP 258 OP 636: Performed by: NURSE PRACTITIONER

## 2020-11-26 PROCEDURE — 71045 X-RAY EXAM CHEST 1 VIEW: CPT | Mod: 26 | Performed by: RADIOLOGY

## 2020-11-26 PROCEDURE — XW033E5 INTRODUCTION OF REMDESIVIR ANTI-INFECTIVE INTO PERIPHERAL VEIN, PERCUTANEOUS APPROACH, NEW TECHNOLOGY GROUP 5: ICD-10-PCS | Performed by: INTERNAL MEDICINE

## 2020-11-26 PROCEDURE — 96361 HYDRATE IV INFUSION ADD-ON: CPT | Performed by: EMERGENCY MEDICINE

## 2020-11-26 PROCEDURE — 99207 PR NON-BILLABLE SERV PER CHARTING: CPT | Performed by: INTERNAL MEDICINE

## 2020-11-26 PROCEDURE — 93010 ELECTROCARDIOGRAM REPORT: CPT | Performed by: EMERGENCY MEDICINE

## 2020-11-26 RX ORDER — IOPAMIDOL 755 MG/ML
53 INJECTION, SOLUTION INTRAVASCULAR ONCE
Status: COMPLETED | OUTPATIENT
Start: 2020-11-26 | End: 2020-11-26

## 2020-11-26 RX ORDER — ONDANSETRON 4 MG/1
4 TABLET, ORALLY DISINTEGRATING ORAL EVERY 6 HOURS PRN
Status: DISCONTINUED | OUTPATIENT
Start: 2020-11-26 | End: 2020-11-30 | Stop reason: HOSPADM

## 2020-11-26 RX ORDER — ACETAMINOPHEN 325 MG/1
650 TABLET ORAL EVERY 4 HOURS PRN
Status: DISCONTINUED | OUTPATIENT
Start: 2020-11-26 | End: 2020-11-30 | Stop reason: HOSPADM

## 2020-11-26 RX ORDER — ONDANSETRON 2 MG/ML
4 INJECTION INTRAMUSCULAR; INTRAVENOUS EVERY 6 HOURS PRN
Status: DISCONTINUED | OUTPATIENT
Start: 2020-11-26 | End: 2020-11-30 | Stop reason: HOSPADM

## 2020-11-26 RX ORDER — CEFTRIAXONE 2 G/1
2 INJECTION, POWDER, FOR SOLUTION INTRAMUSCULAR; INTRAVENOUS EVERY 24 HOURS
Status: DISCONTINUED | OUTPATIENT
Start: 2020-11-26 | End: 2020-11-26

## 2020-11-26 RX ORDER — ZINC SULFATE 50(220)MG
220 CAPSULE ORAL DAILY
Status: DISCONTINUED | OUTPATIENT
Start: 2020-11-27 | End: 2020-11-30 | Stop reason: HOSPADM

## 2020-11-26 RX ORDER — ACETAMINOPHEN 325 MG/1
650 TABLET ORAL ONCE
Status: COMPLETED | OUTPATIENT
Start: 2020-11-26 | End: 2020-11-26

## 2020-11-26 RX ORDER — AMOXICILLIN 250 MG
2 CAPSULE ORAL 2 TIMES DAILY
Status: DISCONTINUED | OUTPATIENT
Start: 2020-11-26 | End: 2020-11-30 | Stop reason: HOSPADM

## 2020-11-26 RX ORDER — AMOXICILLIN 250 MG
1 CAPSULE ORAL 2 TIMES DAILY
Status: DISCONTINUED | OUTPATIENT
Start: 2020-11-26 | End: 2020-11-30 | Stop reason: HOSPADM

## 2020-11-26 RX ORDER — VITAMIN B COMPLEX
50 TABLET ORAL DAILY
Status: DISCONTINUED | OUTPATIENT
Start: 2020-11-27 | End: 2020-11-30 | Stop reason: HOSPADM

## 2020-11-26 RX ORDER — LIDOCAINE 40 MG/G
CREAM TOPICAL
Status: DISCONTINUED | OUTPATIENT
Start: 2020-11-26 | End: 2020-11-30 | Stop reason: HOSPADM

## 2020-11-26 RX ORDER — PIPERACILLIN SODIUM, TAZOBACTAM SODIUM 4; .5 G/20ML; G/20ML
4.5 INJECTION, POWDER, LYOPHILIZED, FOR SOLUTION INTRAVENOUS EVERY 6 HOURS
Status: DISCONTINUED | OUTPATIENT
Start: 2020-11-26 | End: 2020-11-29

## 2020-11-26 RX ORDER — LIDOCAINE 4 G/G
1-3 PATCH TOPICAL
Status: DISCONTINUED | OUTPATIENT
Start: 2020-11-26 | End: 2020-11-30 | Stop reason: HOSPADM

## 2020-11-26 RX ORDER — POLYETHYLENE GLYCOL 3350 17 G/17G
17 POWDER, FOR SOLUTION ORAL DAILY
Status: DISCONTINUED | OUTPATIENT
Start: 2020-11-26 | End: 2020-11-30 | Stop reason: HOSPADM

## 2020-11-26 RX ADMIN — CEFTRIAXONE SODIUM 2 G: 2 INJECTION, POWDER, FOR SOLUTION INTRAMUSCULAR; INTRAVENOUS at 19:29

## 2020-11-26 RX ADMIN — RIVAROXABAN 10 MG: 10 TABLET, FILM COATED ORAL at 19:33

## 2020-11-26 RX ADMIN — REMDESIVIR 200 MG: 100 INJECTION, POWDER, LYOPHILIZED, FOR SOLUTION INTRAVENOUS at 20:07

## 2020-11-26 RX ADMIN — AZITHROMYCIN MONOHYDRATE 500 MG: 500 INJECTION, POWDER, LYOPHILIZED, FOR SOLUTION INTRAVENOUS at 20:11

## 2020-11-26 RX ADMIN — DEXAMETHASONE 6 MG: 2 TABLET ORAL at 16:23

## 2020-11-26 RX ADMIN — LIDOCAINE 1 PATCH: 560 PATCH PERCUTANEOUS; TOPICAL; TRANSDERMAL at 22:21

## 2020-11-26 RX ADMIN — IOPAMIDOL 53 ML: 755 INJECTION, SOLUTION INTRAVENOUS at 13:57

## 2020-11-26 RX ADMIN — ACETAMINOPHEN 650 MG: 325 TABLET, FILM COATED ORAL at 11:38

## 2020-11-26 RX ADMIN — SODIUM CHLORIDE 1000 ML: 9 INJECTION, SOLUTION INTRAVENOUS at 13:17

## 2020-11-26 ASSESSMENT — MIFFLIN-ST. JEOR: SCORE: 1403.7

## 2020-11-26 NOTE — H&P
[facility]     History and Physical - Hospitalist Service, Gold Night        Date of Admission:  11/26/2020    Assessment & Plan   Rony Pandya is a 55 year old male with past medical history significant for recent COVID 19 diagnosis (11/15) and hospitalization for worsening fatigue, COVID related pneumonia, and hyponatremia at [facility] from 11/23-11/24.  He is readmitted to [facility] for hypoxia and dyspnea, and found again to be hyponatremic.     # Acute hypoxic respiratory failure   # COVID 19 infection (11/15)   # Presumed superimposed pneumonia   # Fevers   Presented with fevers and worsening dyspnea and cough.  Febrile to 101.7.  CT chest neg for PE, shows diffuse areas of patchy groundglass opacities, interlobular septal thickenking and traction bronchiectasis c/f infection, and multiple prominent mediastinal and R hilar lymph nodes likely reactive.  CXR w/ mildly increased diffuse interstitial and airspace opacities c/f worsening infection.  Procal 0.14, trop negative.  WBC high normal 10.4.  D dimer 0.9.  CRP 80.   Currently stable on 2L O2.   - Start Dexamethasone 6mg x 10 days  - Start Remdesivir x 5 days   - Azithromycin and Ceftriaxone IV   - Daily COVID labs   - Resume Xarelto    - Continue supplemental O2, wean as tolerated   - PT consult     # Moderate acute on chronic hyponatremia - Etiology unclear.  Possibly 2/2 low PO intake in setting of COVID infection vs adrenal insufficiency vs SIADH.  Appears to have periodically low sodium levels since 6/2020.  Found to have Na 126 on 11/23, improved to 134 on 11/24.  Now low again at 124 on admission 11/26.  Received 1L NS bolus in ED, with increase to Na 130.  K wnl. Denies frequent urination.   - Telemetry  - Check UA, urine lytes  - Check TSH, free T4   - Check AM cortisol level   - Trend Na Q6H    - 1.5 L free water restriction for now   - I/Os, daily weights for now   - Defer IV fluids for now     #  "Anemia - Hgb slightly low at 11.3.  No s/s acute bleed.  No hx renal disease. Possibly nutritional.   - Monitor     # Back pain, rigors - Likely 2/2 fevers.  Denies recent falls/injuries.    - PRN APAP  - Lidoderm patches         Diet:  Regular diet   DVT Prophylaxis:  Xarelto   Bautista Catheter: not present  Code Status:   FULL CODE         Disposition Plan   Expected discharge: 2 - 3 days, recommended to prior living arrangement once stable on RA, fevers resolved, and antibiotic plan in place.  Entered: CRISTIAN Hancock CNP 11/26/2020, 3:37 PM     The patient's care was discussed with the Attending Physician, Dr. Edita Modi.    CRISTIAN Hancock CNP  [facility]   Contact information available via [facility] Paging/Directory  Please see sign in/sign out for up to date coverage information    ______________________________________________________________________    Chief Complaint   \"It was harder to breathe\"     History is obtained from the patient and chart review.    History of Present Illness   Rony Pandya is a 55 year old male with past medical history significant for recent COVID 19 diagnosis (11/15) and hospitalization for worsening fatigue, COVID related pneumonia, and hyponatremia at [facility] from 11/23-11/24.  He is readmitted to [facility] for hypoxia and dyspnea, and found again to be hyponatremic.     Rony is seen in the ED, he is resting comfortably.  He tells me that he was coughing more and feels like his breathing was getting worse.  His cough has been intermittently productive.  He also reports nausea, but hasn't felt like eating much.  Little PO fluid intake.  Has not been urinating excessively.  He denies vomiting.  Reports little bit of chest discomfort.  Reports upper and lower back pain.  Denies any recent falls.  No loss of sensation or strength.       Review of Systems    The 10 point Review of Systems is negative other than noted in the " HPI or here.     Past Medical History    I have reviewed this patient's medical history and updated it with pertinent information if needed.   History reviewed. No pertinent past medical history.    Past Surgical History   I have reviewed this patient's surgical history and updated it with pertinent information if needed.  History reviewed. No pertinent surgical history.    Social History   I have reviewed this patient's social history and updated it with pertinent information if needed.  Social History     Tobacco Use     Smoking status: Never Smoker     Smokeless tobacco: Never Used   Substance Use Topics     Alcohol use: No     Drug use: No       Family History     No significant family history, including no history of: cardiac disease or blood clots     Prior to Admission Medications   Prior to Admission Medications   Prescriptions Last Dose Informant Patient Reported? Taking?   acetaminophen (TYLENOL) 325 MG tablet   No No   Sig: Take 2 tablets (650 mg) by mouth every 4 hours as needed for mild pain   azithromycin (ZITHROMAX) 250 MG tablet   No No   Sig: Take 1 tablet (250 mg) by mouth daily for 4 days   cefuroxime (CEFTIN) 500 MG tablet   No No   Sig: Take 1 tablet (500 mg) by mouth 2 times daily for 8 days   rivaroxaban ANTICOAGULANT (XARELTO) 10 MG TABS tablet   No No   Sig: Take 1 tablet (10 mg) by mouth daily (with dinner) 15 mg twice daily with food for 3 weeks, then 20 mg once daily with food   vitamin C w/VITALY HIPS 500 MG tablet   No No   Sig: Take 1 tablet (500 mg) by mouth 2 times daily   vitamin D3 (CHOLECALCIFEROL) 50 mcg (2000 units) tablet   No No   Sig: Take 1 tablet (50 mcg) by mouth daily   zinc gluconate 50 MG tablet   No No   Sig: Take 1 tablet (50 mg) by mouth daily      Facility-Administered Medications: None     Allergies   No Known Allergies    Physical Exam   Vital Signs: Temp: 99.9  F (37.7  C) Temp src: Oral BP: 134/72 Pulse: 85   Resp: 26 SpO2: 94 % O2 Device: Nasal cannula Oxygen  Delivery: 2 LPM  Weight: 0 lbs 0 oz    GENERAL: Alert and oriented x 3. Well developed.  Petite body habitus.  No acute distress.    HEENT: Normocephalic, atraumatic. Anicteric sclera. Mucous membranes moist.   CV: RRR. S1, S2. No murmurs appreciated.   RESPIRATORY: Effort normal on 2L. Lungs with scant rhonchi bilaterally.   GI: Abdomen soft and non distended, bowel sounds present x all 4 quadrants. Slight TTP over lower quadrant.   NEUROLOGICAL: No focal deficits. Follows commands.  Strength equal in upper and lower extremities.   MUSCULOSKELETAL: No joint swelling or tenderness. Moves all extremities.   EXTREMITIES: No gross deformities. No peripheral edema.   SKIN: Grossly warm, dry, and intact. No jaundice. No rashes.       Data   Data reviewed today: I reviewed all medications, new labs and imaging results over the last 24 hours.     Recent Labs   Lab 11/26/20  1119 11/24/20  0636 11/23/20  1943 11/23/20  1234   WBC 10.4 4.6  --  5.2   HGB 11.3* 11.6*  --  12.8*   MCV 95 98  --  96    163  --  174   INR  --   --   --  1.02   * 134 132* 126*   POTASSIUM 3.7 4.2  --  4.1   CHLORIDE 91* 105  --  93*   CO2 26 26  --  28   BUN 5* 5*  --  7   CR 0.69 0.76  --  0.80   ANIONGAP 8 3  --  5   SHAGGY 8.2* 7.7*  --  8.1*   * 97  --  103*   ALBUMIN 2.7*  --   --  3.5   PROTTOTAL 6.9  --   --  7.6   BILITOTAL 0.7  --   --  0.5   ALKPHOS 72  --   --  76   ALT 33  --   --  26   AST 50*  --   --  35   LIPASE  --   --   --  192   TROPI <0.015  --   --  <0.015     Most Recent 3 CBC's:  Recent Labs   Lab Test 11/26/20  1119 11/24/20  0636 11/23/20  1234   WBC 10.4 4.6 5.2   HGB 11.3* 11.6* 12.8*   MCV 95 98 96    163 174     Most Recent 3 BMP's:  Recent Labs   Lab Test 11/26/20  1119 11/24/20  0636 11/23/20  1943 11/23/20  1234   * 134 132* 126*   POTASSIUM 3.7 4.2  --  4.1   CHLORIDE 91* 105  --  93*   CO2 26 26  --  28   BUN 5* 5*  --  7   CR 0.69 0.76  --  0.80   ANIONGAP 8 3  --  5   SHAGGY 8.2*  7.7*  --  8.1*   * 97  --  103*     Most Recent 2 LFT's:  Recent Labs   Lab Test 11/26/20  1119 11/23/20  1234   AST 50* 35   ALT 33 26   ALKPHOS 72 76   BILITOTAL 0.7 0.5     Most Recent 3 INR's:  Recent Labs   Lab Test 11/23/20  1234   INR 1.02     Recent Results (from the past 24 hour(s))   XR Chest Port 1 View    Narrative    Exam: XR CHEST PORT 1 VW, 11/26/2020 12:45 PM    Indication: covid, SOB, cough    Comparison: 11/23/2020    Findings:   A single portable AP view of the chest was obtained. The cardiac  mediastinal silhouette is within normal limits. No pneumothorax or  significant pleural effusion. Slightly increased diffuse interstitial  opacities and patchy airspace opacities, particularly in the right  lower lobe. The visualized upper abdomen is unremarkable. No acute  osseous abnormalities.      Impression    Impression:   Diffuse interstitial and airspace opacities have mildly increased,  likely representing worsening infection.    JOSE DONNELLY MD   CT Chest Pulmonary Embolism w Contrast    Narrative    Examination:  CT CHEST PULMONARY EMBOLISM W CONTRAST 11/26/2020 2:06  PM     Comparison: Chest radiograph 11/26/2020.    History: Pleuritic chest pain, +dimer, also covid    TECHNIQUE: Volumetric helical acquisition of CT images of the chest  from the lung apices to the kidneys were acquired in arterial phase  after the administration of IV contrast. Three-dimensional (3D)  post-processed angiographic images were reconstructed, archived to  PACS and used in interpretation of this study.   Contrast dose: iopamidol (ISOVUE-370) solution 53 mL    Total DLP: 208 mGy*cm    FINDINGS:    Chest:  There is adequate opacification of the main and lobar pulmonary  arteries. No filling defect in the lobar and main segmental pulmonary  arteries to suggest pulmonary embolism. There is no right-sided heart  strain.    Central tracheobronchial tree is patent. Mild bronchial wall  thickening. There are diffuse  areas of patchy groundglass opacities  throughout both lung fields with intralobular septal thickening and  traction bronchiectasis, most notable in the bilateral lower lobes.  Bibasilar dependent atelectasis. No pneumothorax. No pleural effusion.  No solid pulmonary nodules identified.    The heart is at the upper limits of normal in size. There is no  pericardial effusion. Normal caliber ascending thoracic aorta and main  pulmonary artery. Bovine arch. Scattered prominent subcentimeter  mediastinal lymph nodes without demetrice lymphadenopathy in the chest.  The thyroid is unremarkable.    Bones:  Multilevel degenerative changes of the spine. No suspicious or  abnormal-appearing bone lesions.    Upper abdomen:   Single punctate calcified hepatic granuloma. Moderate colonic stool  burden.         Impression    IMPRESSION:   1. Negative for pulmonary embolism.  2. Diffuse areas of patchy groundglass opacities with interlobular  septal thickening and traction bronchiectasis, concerning for  infection.  3. Multiple prominent mediastinal and right hilar lymph nodes, likely  reactive.    I have personally reviewed the examination and initial interpretation  and I agree with the findings.    JOSE DONNELLY MD

## 2020-11-26 NOTE — ED PROVIDER NOTES
[place] EMERGENCY DEPARTMENT ([facility])  11/26/20 ED 26   History     Chief Complaint   Patient presents with     Shortness of Breath     The history is provided by the patient and medical records.     Rony Pandya is a 55 year old male with PMH notable for COVID-19 diagnosed 11/15/2020 with recent admission 11/23-25/2020 for COVID-19 and hyponatremia who presents to the ED with shortness of breath. Patient reports increasing shortness of breath over the past day.  He endorses continued cough and subjective fevers.  Temperatures have been around 37  F at home.  He endorses pain in the middle of his chest when he takes a deep breath.  Not having any pain otherwise.  He feels more tired.  Patient endorses one episode of vomiting yesterday, and has been nauseous.  He has had some mild diarrhea.      This part of the document was transcribed by Apryl Marshall Scribe.      Past Medical History  History reviewed. No pertinent past medical history.  History reviewed. No pertinent surgical history.       acetaminophen (TYLENOL) 325 MG tablet       azithromycin (ZITHROMAX) 250 MG tablet       cefuroxime (CEFTIN) 500 MG tablet       rivaroxaban ANTICOAGULANT (XARELTO) 10 MG TABS tablet       vitamin C w/VITALY HIPS 500 MG tablet       vitamin D3 (CHOLECALCIFEROL) 50 mcg (2000 units) tablet       zinc gluconate 50 MG tablet      No Known Allergies  Family History  History reviewed. No pertinent family history.  Social History   Social History     Tobacco Use     Smoking status: Never Smoker     Smokeless tobacco: Never Used   Substance Use Topics     Alcohol use: No     Drug use: No      Past medical history, past surgical history, medications, allergies, family history, and social history were reviewed with the patient. No additional pertinent items.      Review of Systems  A complete review of systems was performed with pertinent positives and negatives noted in the HPI, and all other systems  "negative.    Physical Exam   BP: (!) 155/82  Pulse: 92  Temp: 101.7  F (38.7  C)  Resp: 22  Height: 172.7 cm (5' 8\")  SpO2: 96 %    Physical Exam  General: mildly dyspneic appearing. Appears stated age.   HENT: MMM, no oropharyngeal lesions  Eyes: PERRL, normal sclerae  Cardio: regular rate. Regular rhythm. Extremities well perfused  Resp: mildly increased work of breathing, normal respiratory rate  Abdomen: no tenderness, non-distended, no rebound, no guarding  Neuro: alert and fully oriented. CN II-XII grossly intact. Grossly normal strength and sensation in all extremities.   MSK: no deformities.   Integumentary/Skin: no rash visualized, normal color  Psych: normal affect, normal behavior    ED Course      Procedures             EKG Interpretation:      Interpreted by Sven Ren MD  Time reviewed: 1119  Symptoms at time of EKG: chest pain   Rhythm: normal sinus   Rate: normal  Axis: normal  Ectopy: none  Conduction: normal  ST Segments/ T Waves: No ST-T wave changes  Q Waves: none  Comparison to prior: Unchanged from 11/23/2020    Clinical Impression: normal EKG             Labs Ordered and Resulted from Time of ED Arrival Up to the Time of Departure from the ED   CBC WITH PLATELETS DIFFERENTIAL - Abnormal; Notable for the following components:       Result Value    RBC Count 3.47 (*)     Hemoglobin 11.3 (*)     Hematocrit 32.9 (*)     Absolute Neutrophil 9.4 (*)     Absolute Lymphocytes 0.5 (*)     All other components within normal limits   COMPREHENSIVE METABOLIC PANEL - Abnormal; Notable for the following components:    Sodium 124 (*)     Chloride 91 (*)     Glucose 122 (*)     Urea Nitrogen 5 (*)     Calcium 8.2 (*)     Albumin 2.7 (*)     AST 50 (*)     All other components within normal limits   D DIMER QUANTITATIVE - Abnormal; Notable for the following components:    D Dimer 0.9 (*)     All other components within normal limits   TROPONIN I   PROCALCITONIN   PERIPHERAL IV CATHETER   CARDIAC " CONTINUOUS MONITORING     XR Chest Port 1 View   Final Result   Impression:    Diffuse interstitial and airspace opacities have mildly increased,   likely representing worsening infection.      JOSE DONNELLY MD      CT Chest Pulmonary Embolism w Contrast    (Results Pending)          Assessments & Plan (with Medical Decision Making)   Patient presenting with shortness of breath in the context of recent COVID-19 diagnosis. Vitals in the ED notable for room air SpO2 95%, normal HR. Nursing notes reviewed. Initial differential diagnosis includes but not limited to COVID-19 pneumonia, bacterial pneumonia, ACS, pulmonary embolism, pleurisy, pericarditis.    EKG demonstrates no evidence of acute ischemia, troponin negative.  Chest x-ray shows worsening infiltrates, previously only in LLL now also in RLL. Procalcitonin in low risk range for bacterial pneumonia. Patient has been on azithromycin + cefuroxime as outpatient - will defer further Abx for now. Worsening likely COVID-19 viral pneumonia.      Na 124. Patient had been 134 a few days ago. Acuity of the Na decrease makes low risk of pontine myelinolysis with quick normalization. NS given.     The complete clinical picture is most consistent with worsening COVID-19 pneumonia and hyponatremia. After counseling on the diagnosis, work-up, and treatment plan, the patient was admitted to medicine.     This part of the document was transcribed by Balbina Steve Medical Scribe.      Final diagnoses:   Hyponatremia   2019 novel coronavirus disease (COVID-19)   Shortness of breath     New Prescriptions    No medications on file   I, Balbina Steve, am serving as a trained medical scribe to document services personally performed by Sven Ren MD based on the provider's statements to me on November 26, 2020.  This document has been checked and approved by the attending provider.    I, Sven Ren MD, was physically present and have reviewed and verified the  accuracy of this note documented by Balbina Steve, medical scribe.       --  Sven Ren MD   Emergency Medicine   [facility] EMERGENCY DEPARTMENT  11/26/2020     Sven Ren MD  11/26/20 1198

## 2020-11-26 NOTE — PROGRESS NOTES
[facility]  Transfer Triage Note    Date of call: 11/26/20  Time of call: 1:30 PM    Is pandemic COVID-19 a concern? YES:   1.  Travel history/exposure history (select all that apply): likely community spread and other   2.  Vitals: Fill in 99.9  84  118/92  19  95% RA  3.  On Oxygen? (select one option):  none  4.  History of lung disease or active smoking (or other risk factors for death/respiratory      failure?: Yes: +COVID pneumonia  5.  Type of bed requested (select one option): med/surg  6.  Other Isolation needed (select all that apply): Airborne  7.  Has the patient been added to the shared patient list 'COVID'?  Yes      Reason for transfer: +COVID   Diagnosis: +COVID    Outside Records: Available  Additional records requested to be faxed to [phone].    Stability of Patient: Patient is at risk for instability, however patient requires urgent transfer and does not meet ICU criteria   ICU: No    Expected Time of Arrival for Transfer: 0-8 hours    Arrival Location:  [address] [place] [address] Phone: [phone]    Recommendations for Management and Stabilization: Given    Additional Comments   55M h/o COVID diagnosed on 11/15 at [facility]  Discharged home on 11/24  Returns to [facility] today 11/26 with persistent dyspnea  Stable on room air but decision made by ED to admit for further monitoring of respiratory status in setting of worsening clinical symptoms  Was accepted for transfer directly from ED to [facility] COVID unit today. Bed available and report being called now.     Severiano Camilo MD    ADDENDUM     Ultimately refused transfer to [facility] when EMS came. Will stay at [facility] and admit to medicine.

## 2020-11-26 NOTE — ED TRIAGE NOTES
Pt arrives with c/o shortness of breath that is not improving. Positive covid test on 11/15 per pt report. Febrile and mildly hypertensive in triage other VSS on RA. A+O

## 2020-11-26 NOTE — ED NOTES
Sign out Provider: Mikal  Sign out Plan: 55-year-old female with Covid and hyponatremia currently awaiting admission to medicine for recurrent hyponatremia and worsening shortness of breath.  Patient had an elevated D-dimer and CT of the chest is pending.    Reassessment: CT chest shows no evidence of PE.  She does have multiple groundglass opacities consistent with known Covid.  She has been on antibiotics already at home.    Disposition: Admit           Gauri Lopez MD  11/26/20 4747

## 2020-11-26 NOTE — ED NOTES
Writer spoke to patient regarding transfer to [facility]. It was explained to patient that there are no beds available here but it is his choice to remain at [facility] or accept care at other hospital. Pt declined transfer. Dr. Ren notified.

## 2020-11-27 ENCOUNTER — APPOINTMENT (OUTPATIENT)
Dept: PHYSICAL THERAPY | Facility: CLINIC | Age: 55
End: 2020-11-27
Attending: NURSE PRACTITIONER
Payer: COMMERCIAL

## 2020-11-27 LAB
ALBUMIN SERPL-MCNC: 2.5 G/DL (ref 3.4–5)
ALP SERPL-CCNC: 70 U/L (ref 40–150)
ALT SERPL W P-5'-P-CCNC: 30 U/L (ref 0–70)
ANION GAP SERPL CALCULATED.3IONS-SCNC: 9 MMOL/L (ref 3–14)
AST SERPL W P-5'-P-CCNC: 46 U/L (ref 0–45)
BASOPHILS # BLD AUTO: 0 10E9/L (ref 0–0.2)
BASOPHILS NFR BLD AUTO: 0 %
BILIRUB SERPL-MCNC: 0.6 MG/DL (ref 0.2–1.3)
BUN SERPL-MCNC: 8 MG/DL (ref 7–30)
C DIFF TOX B STL QL: NEGATIVE
CALCIUM SERPL-MCNC: 8.3 MG/DL (ref 8.5–10.1)
CHLORIDE SERPL-SCNC: 104 MMOL/L (ref 94–109)
CHOLEST SERPL-MCNC: 69 MG/DL
CO2 SERPL-SCNC: 21 MMOL/L (ref 20–32)
CORTIS SERPL-MCNC: 5.3 UG/DL (ref 4–22)
CREAT SERPL-MCNC: 0.71 MG/DL (ref 0.66–1.25)
CRP SERPL-MCNC: 190 MG/L (ref 0–8)
D DIMER PPP FEU-MCNC: 0.8 UG/ML FEU (ref 0–0.5)
DIFFERENTIAL METHOD BLD: ABNORMAL
EOSINOPHIL # BLD AUTO: 0 10E9/L (ref 0–0.7)
EOSINOPHIL NFR BLD AUTO: 0 %
ERYTHROCYTE [DISTWIDTH] IN BLOOD BY AUTOMATED COUNT: 12.1 % (ref 10–15)
FIBRINOGEN PPP-MCNC: 599 MG/DL (ref 200–420)
GFR SERPL CREATININE-BSD FRML MDRD: >90 ML/MIN/{1.73_M2}
GLUCOSE SERPL-MCNC: 119 MG/DL (ref 70–99)
GRAM STN SPEC: ABNORMAL
HCT VFR BLD AUTO: 33.9 % (ref 40–53)
HDLC SERPL-MCNC: 45 MG/DL
HGB BLD-MCNC: 11.7 G/DL (ref 13.3–17.7)
IL6 SERPL-MCNC: 30.43 PG/ML
INR PPP: 1.41 (ref 0.86–1.14)
LDH SERPL L TO P-CCNC: 342 U/L (ref 85–227)
LDLC SERPL CALC-MCNC: 12 MG/DL
LYMPHOCYTES # BLD AUTO: 0.4 10E9/L (ref 0.8–5.3)
LYMPHOCYTES NFR BLD AUTO: 3.5 %
Lab: ABNORMAL
MCH RBC QN AUTO: 33.2 PG (ref 26.5–33)
MCHC RBC AUTO-ENTMCNC: 34.5 G/DL (ref 31.5–36.5)
MCV RBC AUTO: 96 FL (ref 78–100)
MONOCYTES # BLD AUTO: 0.3 10E9/L (ref 0–1.3)
MONOCYTES NFR BLD AUTO: 2.6 %
MRSA DNA SPEC QL NAA+PROBE: NEGATIVE
NEUTROPHILS # BLD AUTO: 12 10E9/L (ref 1.6–8.3)
NEUTROPHILS NFR BLD AUTO: 93.9 %
NONHDLC SERPL-MCNC: 24 MG/DL
PLATELET # BLD AUTO: 276 10E9/L (ref 150–450)
PLATELET # BLD EST: ABNORMAL 10*3/UL
POTASSIUM SERPL-SCNC: 3.4 MMOL/L (ref 3.4–5.3)
PROT SERPL-MCNC: 6.8 G/DL (ref 6.8–8.8)
RBC # BLD AUTO: 3.52 10E12/L (ref 4.4–5.9)
RBC MORPH BLD: NORMAL
RETICS # AUTO: 23.9 10E9/L (ref 25–95)
RETICS/RBC NFR AUTO: 0.7 % (ref 0.5–2)
SODIUM SERPL-SCNC: 133 MMOL/L (ref 133–144)
SODIUM SERPL-SCNC: 134 MMOL/L (ref 133–144)
SPECIMEN SOURCE: ABNORMAL
SPECIMEN SOURCE: NORMAL
SPECIMEN SOURCE: NORMAL
TRIGL SERPL-MCNC: 59 MG/DL
WBC # BLD AUTO: 12.8 10E9/L (ref 4–11)

## 2020-11-27 PROCEDURE — 84295 ASSAY OF SERUM SODIUM: CPT | Performed by: NURSE PRACTITIONER

## 2020-11-27 PROCEDURE — 250N000013 HC RX MED GY IP 250 OP 250 PS 637: Performed by: STUDENT IN AN ORGANIZED HEALTH CARE EDUCATION/TRAINING PROGRAM

## 2020-11-27 PROCEDURE — 85045 AUTOMATED RETICULOCYTE COUNT: CPT | Performed by: NURSE PRACTITIONER

## 2020-11-27 PROCEDURE — 250N000013 HC RX MED GY IP 250 OP 250 PS 637: Performed by: NURSE PRACTITIONER

## 2020-11-27 PROCEDURE — 83615 LACTATE (LD) (LDH) ENZYME: CPT | Performed by: NURSE PRACTITIONER

## 2020-11-27 PROCEDURE — 82533 TOTAL CORTISOL: CPT | Performed by: NURSE PRACTITIONER

## 2020-11-27 PROCEDURE — 97116 GAIT TRAINING THERAPY: CPT | Mod: GP

## 2020-11-27 PROCEDURE — 85384 FIBRINOGEN ACTIVITY: CPT | Performed by: NURSE PRACTITIONER

## 2020-11-27 PROCEDURE — 258N000003 HC RX IP 258 OP 636: Performed by: NURSE PRACTITIONER

## 2020-11-27 PROCEDURE — 258N000003 HC RX IP 258 OP 636

## 2020-11-27 PROCEDURE — 87205 SMEAR GRAM STAIN: CPT | Performed by: INTERNAL MEDICINE

## 2020-11-27 PROCEDURE — 120N000002 HC R&B MED SURG/OB UMMC

## 2020-11-27 PROCEDURE — 87070 CULTURE OTHR SPECIMN AEROBIC: CPT | Performed by: INTERNAL MEDICINE

## 2020-11-27 PROCEDURE — 85379 FIBRIN DEGRADATION QUANT: CPT | Performed by: NURSE PRACTITIONER

## 2020-11-27 PROCEDURE — 250N000011 HC RX IP 250 OP 636

## 2020-11-27 PROCEDURE — 250N000011 HC RX IP 250 OP 636: Performed by: NURSE PRACTITIONER

## 2020-11-27 PROCEDURE — 80053 COMPREHEN METABOLIC PANEL: CPT | Performed by: NURSE PRACTITIONER

## 2020-11-27 PROCEDURE — 36415 COLL VENOUS BLD VENIPUNCTURE: CPT | Performed by: NURSE PRACTITIONER

## 2020-11-27 PROCEDURE — 85610 PROTHROMBIN TIME: CPT | Performed by: NURSE PRACTITIONER

## 2020-11-27 PROCEDURE — 258N000003 HC RX IP 258 OP 636: Performed by: STUDENT IN AN ORGANIZED HEALTH CARE EDUCATION/TRAINING PROGRAM

## 2020-11-27 PROCEDURE — 250N000011 HC RX IP 250 OP 636: Performed by: INTERNAL MEDICINE

## 2020-11-27 PROCEDURE — 83520 IMMUNOASSAY QUANT NOS NONAB: CPT | Performed by: NURSE PRACTITIONER

## 2020-11-27 PROCEDURE — 86140 C-REACTIVE PROTEIN: CPT | Performed by: NURSE PRACTITIONER

## 2020-11-27 PROCEDURE — 80061 LIPID PANEL: CPT | Performed by: NURSE PRACTITIONER

## 2020-11-27 PROCEDURE — 87493 C DIFF AMPLIFIED PROBE: CPT | Performed by: NURSE PRACTITIONER

## 2020-11-27 PROCEDURE — 97530 THERAPEUTIC ACTIVITIES: CPT | Mod: GP

## 2020-11-27 PROCEDURE — 250N000012 HC RX MED GY IP 250 OP 636 PS 637: Performed by: NURSE PRACTITIONER

## 2020-11-27 PROCEDURE — 87640 STAPH A DNA AMP PROBE: CPT | Performed by: INTERNAL MEDICINE

## 2020-11-27 PROCEDURE — 99233 SBSQ HOSP IP/OBS HIGH 50: CPT | Performed by: STUDENT IN AN ORGANIZED HEALTH CARE EDUCATION/TRAINING PROGRAM

## 2020-11-27 PROCEDURE — 87641 MR-STAPH DNA AMP PROBE: CPT | Performed by: INTERNAL MEDICINE

## 2020-11-27 PROCEDURE — 85025 COMPLETE CBC W/AUTO DIFF WBC: CPT | Performed by: NURSE PRACTITIONER

## 2020-11-27 PROCEDURE — 250N000009 HC RX 250: Performed by: NURSE PRACTITIONER

## 2020-11-27 PROCEDURE — 97161 PT EVAL LOW COMPLEX 20 MIN: CPT | Mod: GP

## 2020-11-27 RX ORDER — SODIUM CHLORIDE 9 MG/ML
INJECTION, SOLUTION INTRAVENOUS CONTINUOUS
Status: DISCONTINUED | OUTPATIENT
Start: 2020-11-27 | End: 2020-11-28

## 2020-11-27 RX ORDER — LOPERAMIDE HCL 2 MG
2 CAPSULE ORAL 4 TIMES DAILY PRN
Status: DISCONTINUED | OUTPATIENT
Start: 2020-11-27 | End: 2020-11-30 | Stop reason: HOSPADM

## 2020-11-27 RX ADMIN — SODIUM CHLORIDE: 9 INJECTION, SOLUTION INTRAVENOUS at 12:10

## 2020-11-27 RX ADMIN — VANCOMYCIN HYDROCHLORIDE 1250 MG: 100 INJECTION, POWDER, LYOPHILIZED, FOR SOLUTION INTRAVENOUS at 00:04

## 2020-11-27 RX ADMIN — ZINC SULFATE 220 MG (50 MG) CAPSULE 220 MG: CAPSULE at 08:17

## 2020-11-27 RX ADMIN — PIPERACILLIN SODIUM AND TAZOBACTAM SODIUM 4.5 G: 4; .5 INJECTION, POWDER, LYOPHILIZED, FOR SOLUTION INTRAVENOUS at 05:26

## 2020-11-27 RX ADMIN — LOPERAMIDE HYDROCHLORIDE 2 MG: 2 CAPSULE ORAL at 12:03

## 2020-11-27 RX ADMIN — RIVAROXABAN 10 MG: 10 TABLET, FILM COATED ORAL at 17:40

## 2020-11-27 RX ADMIN — ONDANSETRON 4 MG: 4 TABLET, ORALLY DISINTEGRATING ORAL at 21:30

## 2020-11-27 RX ADMIN — VANCOMYCIN HYDROCHLORIDE 1250 MG: 100 INJECTION, POWDER, LYOPHILIZED, FOR SOLUTION INTRAVENOUS at 23:51

## 2020-11-27 RX ADMIN — PIPERACILLIN SODIUM AND TAZOBACTAM SODIUM 4.5 G: 4; .5 INJECTION, POWDER, LYOPHILIZED, FOR SOLUTION INTRAVENOUS at 00:43

## 2020-11-27 RX ADMIN — PIPERACILLIN SODIUM AND TAZOBACTAM SODIUM 4.5 G: 4; .5 INJECTION, POWDER, LYOPHILIZED, FOR SOLUTION INTRAVENOUS at 17:40

## 2020-11-27 RX ADMIN — PIPERACILLIN SODIUM AND TAZOBACTAM SODIUM 4.5 G: 4; .5 INJECTION, POWDER, LYOPHILIZED, FOR SOLUTION INTRAVENOUS at 10:35

## 2020-11-27 RX ADMIN — REMDESIVIR 100 MG: 100 INJECTION, POWDER, LYOPHILIZED, FOR SOLUTION INTRAVENOUS at 21:32

## 2020-11-27 RX ADMIN — PIPERACILLIN SODIUM AND TAZOBACTAM SODIUM 4.5 G: 4; .5 INJECTION, POWDER, LYOPHILIZED, FOR SOLUTION INTRAVENOUS at 23:51

## 2020-11-27 RX ADMIN — VANCOMYCIN HYDROCHLORIDE 1250 MG: 100 INJECTION, POWDER, LYOPHILIZED, FOR SOLUTION INTRAVENOUS at 12:03

## 2020-11-27 RX ADMIN — DEXAMETHASONE 6 MG: 2 TABLET ORAL at 08:17

## 2020-11-27 RX ADMIN — Medication 50 MCG: at 08:17

## 2020-11-27 ASSESSMENT — ACTIVITIES OF DAILY LIVING (ADL)
ADLS_ACUITY_SCORE: 16
ADLS_ACUITY_SCORE: 16
ADLS_ACUITY_SCORE: 12
ADLS_ACUITY_SCORE: 16

## 2020-11-27 NOTE — PLAN OF CARE
"Lhb-dp-[id number] progress note. Care from: 07:00 - 19:00     /72   Pulse 77   Temp 98.8  F (37.1  C) (Oral)   Resp 22   Ht 1.727 m (5' 8\")   Wt 59.9 kg (132 lb 1.6 oz)   SpO2 99%   BMI 20.09 kg/m       /85   Pulse 77   Temp 97.2  F (36.2  C) (Oral)   Resp 18   Ht 1.727 m (5' 8\")   Wt 59.9 kg (132 lb 1.6 oz)   SpO2 97%   BMI 20.09 kg/m          Vitals: VSS     Neuro: WDL, a&o x 4   o Pain: Pt has mild abdominal pain   o Mood/Behavior: Calm, cooperative     Activity: Pt up with standby assist to bathroom and commode in room. Worked with therapy today as well     Cardiovascular: WDL     Respiratory: WDL ex intermittent shortness of breath, maintaining SpO2 on 1-2 LPM O2 via NC. Sputum sample sent to lab.     GI & Nutrition: Pt has fair appetite  o Nausea: Denies   o Bowel Movement: Pt having frequent loose stools/diarrhea. C. Diff negative. Lomotil given x 1 with relief     : WDL     Skin, Wounds & LDAs: Skin intact. PIV x 2 intact, saline locked between abx infusions.     Notable Labs: Leukocytosis     Events & Plan Updates: Continue to monitor/manage respiratory status, diarrhea   "

## 2020-11-27 NOTE — PHARMACY-VANCOMYCIN DOSING SERVICE
Pharmacy Vancomycin Initial Note  Date of Service 2020  Patient's  1965  55 year old, male    Indication: Healthcare-Associated Pneumonia    Current estimated CrCl = Estimated Creatinine Clearance: 101.3 mL/min (based on SCr of 0.69 mg/dL).    Creatinine for last 3 days  2020:  6:36 AM Creatinine 0.76 mg/dL  2020: 11:19 AM Creatinine 0.69 mg/dL    Recent Vancomycin Level(s) for last 3 days  No results found for requested labs within last 72 hours.      Vancomycin IV Administrations (past 72 hours)      No vancomycin orders with administrations in past 72 hours.                Nephrotoxins and other renal medications (From now, onward)    Start     Dose/Rate Route Frequency Ordered Stop    20 2300  piperacillin-tazobactam (ZOSYN) 4.5 g vial to attach to  mL bag      4.5 g  over 30 Minutes Intravenous EVERY 6 HOURS 20 2219      20 2300  vancomycin 1250 mg in 0.9% NaCl 250 mL intermittent infusion 1,250 mg      1,250 mg  over 90 Minutes Intravenous EVERY 12 HOURS 20 2241            Contrast Orders - past 72 hours (72h ago, onward)    Start     Dose/Rate Route Frequency Ordered Stop    20 1335  iopamidol (ISOVUE-370) solution 53 mL      53 mL Intravenous ONCE 20 1335 20 1357                Plan:  1.  Start vancomycin  1250 mg IV q12h.   2.  Goal Trough Level: 15-20 mg/L   3.  Pharmacy will check trough levels as appropriate in 1-3 Days.    4. Serum creatinine levels will be ordered daily for the first week of therapy and at least twice weekly for subsequent weeks.    5. Fannin method utilized to dose vancomycin therapy: Method 2    Torres Edmond, [facility]

## 2020-11-27 NOTE — SUMMARY OF CARE
Reason for admission: COVID-19 and hyponatremia  Admitted from: [place] [facility]  Report received from: RUBY Duggan  2 RN skin assessment completed by: Susana LE RN and RUBY Schofield  Bed Algorithm reevaluated: Yes  Was Pulsate ordered?:No  Belongings: Clothing, jacket, shoes, and cell phone

## 2020-11-27 NOTE — PROGRESS NOTES
11/27/20 1217   Quick Adds   Type of Visit Initial PT Evaluation   Living Environment   People in home   (6 family members)   Current Living Arrangements apartment   Home Accessibility no concerns   Transportation Anticipated family or friend will provide   Living Environment Comments pt reports he lives in an apartment with 6 relatives. reports assistance is available but does not elaborate on quantity and level of physical assistance available. denies accessibility concerns and reports an elevator is present.    Self-Care   Usual Activity Tolerance moderate   Current Activity Tolerance poor   Regular Exercise No   Equipment Currently Used at Home none   Activity/Exercise/Self-Care Comment pt with limited activity tolerance 2/2 SOB   Disability/Function   Hearing Difficulty or Deaf no   Wear Glasses or Blind no   Concentrating, Remembering or Making Decisions Difficulty no   Difficulty Communicating no   Difficulty Eating/Swallowing no   Walking or Climbing Stairs Difficulty no   Dressing/Bathing Difficulty no   Toileting no   Doing Errands Independently Difficulty (such as shopping) no   Fall history within last six months no   Change in Functional Status Since Onset of Current Illness/Injury yes   General Information   Onset of Illness/Injury or Date of Surgery 11/26/20   Referring Physician Sarah Salas APRN CNP   Patient/Family Therapy Goals Statement (PT) to return home once feeling better   Pertinent History of Current Problem (include personal factors and/or comorbidities that impact the POC) Rony Pandya is a 55 year old male with past medical history significant for recent COVID 19 diagnosis (11/15) and hospitalization for worsening fatigue, COVID related pneumonia, and hyponatremia at [facility] from 11/23-11/24.  He is readmitted to [facility] for hypoxia and dyspnea, and found again to be hyponatremic.    Existing Precautions/Restrictions fall;oxygen therapy device and L/min  (1LPM via  NC)   General Observations activity: up ad marie   Cognition   Orientation Status (Cognition) oriented x 3   Integumentary/Edema   Integumentary/Edema no deficits were identifed   Posture    Posture Forward head position;Protracted shoulders   Range of Motion (ROM)   ROM Comment BUE/BLE wfl   Strength   Strength Comments BLE > 3+/5 per observation   Bed Mobility   Comment (Bed Mobility) SBA supine <> sit   Transfers   Transfer Safety Comments SBA sit <> stand transfer up to FWW, slowed performance   Gait/Stairs (Locomotion)   Comment (Gait/Stairs) amb x 10' in room with FWW and SBA, steady on feet. limited by lines and SOB.    Balance   Balance Comments SBA with FWW   Sensory Examination   Sensory Perception Comments denies deficits   Clinical Impression   Criteria for Skilled Therapeutic Intervention yes, treatment indicated   PT Diagnosis (PT) impaired functional mobility   Influenced by the following impairments activity tolerance, O2 demand, weakness, fatigue   Functional limitations due to impairments below baseline tolerance of functional activity   Clinical Presentation Stable/Uncomplicated   Clinical Presentation Rationale pt presentation, clinical reasoning   Clinical Decision Making (Complexity) low complexity   Therapy Frequency (PT) 5x/week   Predicted Duration of Therapy Intervention (days/wks) 1 week   Planned Therapy Interventions (PT) balance training;strengthening;neuromuscular re-education;home exercise program;transfer training   Anticipated Equipment Needs at Discharge (PT)   (TBD, possible FWW)   Risk & Benefits of therapy have been explained evaluation/treatment results reviewed;care plan/treatment goals reviewed;risks/benefits reviewed;participants included;patient   Clinical Impression Comments pt presents with significanlty limited activity tolerance 2/2 SOB. VSS remain stable on 1 LPM via NC throughout session with activity. will benefit from ongoing therapy to improve mobility status.   PT  Discharge Planning    PT Discharge Recommendation (DC Rec) Transitional Care Facility   PT Rationale for DC Rec pt will benefit from rehab stay to improve functional mobility status and activity tolerance. pt declining this option and states 'that is where everyone is dying.' pending progress pt may be appropriate for returnt o home with assistance and HHPT   PT Brief overview of current status  A x 1+ FWW   Total Evaluation Time   Total Evaluation Time (Minutes) 7

## 2020-11-27 NOTE — PROGRESS NOTES
[facility]     Medicine Progress Note - Hospitalist Service, Gold 9       Date of Admission:  11/26/2020  Assessment & Plan   Mr. Marrero is a 56 yo man with history of recent COVID-19 infection (11/15) and hospitalization from 11/23-11/24 who presented with worsening hypoxia, ongoing fevers, and found to be hyponatremic.     # Acute hypoxic respiratory failure   # COVID-19 infection  # Concern for bacterial pneumonia  - Continue dexamethasone 6mg x10 days  - Continue remdesivir x 5 days  - Started on vancomycin and zosyn given recent hospital exposure- MRSA swabs negative, stop vancomycin today  - Follow up sputum and blood cultures  - Daily covid labs  - Continue xarelto  - Supplemental oxygen, wean as tolerated  - PT consult    # Acute on chronic hyponatremia  Na improved with fluids, less like SIADH. Suspect hypovolemic hyponatremia in the setting of diarrhea. Also, has had periodically low Na since 6/2020. Cortisol level normal.   - I/Os  - Will give 1 L IVF given ongoing diarrhea and patient feeling dehydrated  - BMP in AM    # Diarrhea  Likely due to covid, c. Diff negative  - Imodium prn   - IVF as above    # Anemia  Hgb 11.3, no signs of bleeding.     # Back pain  Unclear etiology, perhaps myalgia and fevers. Improved with lidoderm patch.  - Tylenol prn and lidoderm patch prn      Diet: Advance Diet as Tolerated: Regular Diet Adult    DVT Prophylaxis: Xarelto  Bautista Catheter: not present  Code Status: Full Code         Disposition Plan   Expected discharge: 2 - 3 days, recommended to prior living arrangement once antibiotic plan established and O2 use less than 2 liters/minute.  Entered: Dang Mena MD 11/27/2020, 5:28 PM     The patient's care was discussed with the Bedside Nurse, Care Coordinator/ and Patient.    Dang Mena MD  Hospitalist Service, Gold 9  [facility]   Contact information  available via [facility] Paging/Directory  Please see sign in/sign out for up to date coverage information  ______________________________________________________________________    Interval History   Rony is feeling a little better today. He continues to have cough and shortness of breath. His back pain is better with the lidoderm patch. He continues to have significant diarrhea. He denies abdominal pain, nausea, or vomiting.     Data reviewed today: I reviewed all medications, new labs and imaging results over the last 24 hours. I personally reviewed no images or EKG's today.    Physical Exam   Vital Signs: Temp: 97.2  F (36.2  C) Temp src: Oral BP: 136/85 Pulse: 77   Resp: 18 SpO2: 97 % O2 Device: Nasal cannula Oxygen Delivery: 1 LPM  Weight: 132 lbs 1.6 oz  General Appearance: Alert, pleasant man, NAD.   Respiratory: Coarse breath sound. No wheezing or crackles. Good air entry. On 2 L.  Cardiovascular: RRR. Normal S1/S2. No murmurs.   GI: Soft, non-tender, non-distended. Active bowel sounds.  Skin: No rash or lesions.  Other: Alert and oriented x3. CNII-XII grossly intact.     Data   Recent Labs   Lab 11/27/20  0556 11/27/20  0022 11/26/20  1821 11/26/20  1119 11/24/20  0636 11/23/20  1234 11/23/20  1234   WBC 12.8*  --   --  10.4 4.6  --  5.2   HGB 11.7*  --   --  11.3* 11.6*  --  12.8*   MCV 96  --   --  95 98  --  96     --   --  214 163  --  174   INR 1.41*  --   --   --   --   --  1.02    133 130* 124* 134   < > 126*   POTASSIUM 3.4  --   --  3.7 4.2  --  4.1   CHLORIDE 104  --   --  91* 105  --  93*   CO2 21  --   --  26 26  --  28   BUN 8  --   --  5* 5*  --  7   CR 0.71  --   --  0.69 0.76  --  0.80   ANIONGAP 9  --   --  8 3  --  5   SHAGGY 8.3*  --   --  8.2* 7.7*  --  8.1*   *  --   --  122* 97  --  103*   ALBUMIN 2.5*  --   --  2.7*  --   --  3.5   PROTTOTAL 6.8  --   --  6.9  --   --  7.6   BILITOTAL 0.6  --   --  0.7  --   --  0.5   ALKPHOS 70  --   --  72  --   --  76   ALT 30  --   --   33  --   --  26   AST 46*  --   --  50*  --   --  35   LIPASE  --   --   --   --   --   --  192   TROPI  --   --   --  <0.015  --   --  <0.015    < > = values in this interval not displayed.

## 2020-11-28 LAB
ALBUMIN SERPL-MCNC: 2.2 G/DL (ref 3.4–5)
ALP SERPL-CCNC: 65 U/L (ref 40–150)
ALT SERPL W P-5'-P-CCNC: 25 U/L (ref 0–70)
ANION GAP SERPL CALCULATED.3IONS-SCNC: 8 MMOL/L (ref 3–14)
AST SERPL W P-5'-P-CCNC: 36 U/L (ref 0–45)
BASOPHILS # BLD AUTO: 0 10E9/L (ref 0–0.2)
BASOPHILS NFR BLD AUTO: 0.1 %
BILIRUB SERPL-MCNC: 1.2 MG/DL (ref 0.2–1.3)
BUN SERPL-MCNC: 12 MG/DL (ref 7–30)
CALCIUM SERPL-MCNC: 8.2 MG/DL (ref 8.5–10.1)
CHLORIDE SERPL-SCNC: 108 MMOL/L (ref 94–109)
CO2 SERPL-SCNC: 21 MMOL/L (ref 20–32)
CREAT SERPL-MCNC: 0.79 MG/DL (ref 0.66–1.25)
CRP SERPL-MCNC: 110 MG/L (ref 0–8)
D DIMER PPP FEU-MCNC: 0.5 UG/ML FEU (ref 0–0.5)
DIFFERENTIAL METHOD BLD: ABNORMAL
EOSINOPHIL # BLD AUTO: 0 10E9/L (ref 0–0.7)
EOSINOPHIL NFR BLD AUTO: 0 %
ERYTHROCYTE [DISTWIDTH] IN BLOOD BY AUTOMATED COUNT: 12.3 % (ref 10–15)
FIBRINOGEN PPP-MCNC: 504 MG/DL (ref 200–420)
GFR SERPL CREATININE-BSD FRML MDRD: >90 ML/MIN/{1.73_M2}
GLUCOSE SERPL-MCNC: 121 MG/DL (ref 70–99)
HCT VFR BLD AUTO: 34.2 % (ref 40–53)
HGB BLD-MCNC: 11.6 G/DL (ref 13.3–17.7)
IMM GRANULOCYTES # BLD: 0.3 10E9/L (ref 0–0.4)
IMM GRANULOCYTES NFR BLD: 1.3 %
INR PPP: 1.59 (ref 0.86–1.14)
LDH SERPL L TO P-CCNC: 339 U/L (ref 85–227)
LYMPHOCYTES # BLD AUTO: 0.9 10E9/L (ref 0.8–5.3)
LYMPHOCYTES NFR BLD AUTO: 4.1 %
MCH RBC QN AUTO: 33.2 PG (ref 26.5–33)
MCHC RBC AUTO-ENTMCNC: 33.9 G/DL (ref 31.5–36.5)
MCV RBC AUTO: 98 FL (ref 78–100)
MONOCYTES # BLD AUTO: 1 10E9/L (ref 0–1.3)
MONOCYTES NFR BLD AUTO: 4.6 %
NEUTROPHILS # BLD AUTO: 19.5 10E9/L (ref 1.6–8.3)
NEUTROPHILS NFR BLD AUTO: 89.9 %
NRBC # BLD AUTO: 0 10*3/UL
NRBC BLD AUTO-RTO: 0 /100
PLATELET # BLD AUTO: 305 10E9/L (ref 150–450)
POTASSIUM SERPL-SCNC: 3.5 MMOL/L (ref 3.4–5.3)
PROT SERPL-MCNC: 6.2 G/DL (ref 6.8–8.8)
RBC # BLD AUTO: 3.49 10E12/L (ref 4.4–5.9)
RETICS # AUTO: 23.7 10E9/L (ref 25–95)
RETICS/RBC NFR AUTO: 0.7 % (ref 0.5–2)
SODIUM SERPL-SCNC: 137 MMOL/L (ref 133–144)
VANCOMYCIN SERPL-MCNC: 13.3 MG/L
WBC # BLD AUTO: 21.7 10E9/L (ref 4–11)

## 2020-11-28 PROCEDURE — 250N000009 HC RX 250: Performed by: NURSE PRACTITIONER

## 2020-11-28 PROCEDURE — 250N000012 HC RX MED GY IP 250 OP 636 PS 637: Performed by: NURSE PRACTITIONER

## 2020-11-28 PROCEDURE — 85379 FIBRIN DEGRADATION QUANT: CPT | Performed by: NURSE PRACTITIONER

## 2020-11-28 PROCEDURE — 86140 C-REACTIVE PROTEIN: CPT | Performed by: NURSE PRACTITIONER

## 2020-11-28 PROCEDURE — 85384 FIBRINOGEN ACTIVITY: CPT | Performed by: NURSE PRACTITIONER

## 2020-11-28 PROCEDURE — 85610 PROTHROMBIN TIME: CPT | Performed by: NURSE PRACTITIONER

## 2020-11-28 PROCEDURE — 250N000011 HC RX IP 250 OP 636

## 2020-11-28 PROCEDURE — 80053 COMPREHEN METABOLIC PANEL: CPT | Performed by: NURSE PRACTITIONER

## 2020-11-28 PROCEDURE — 36415 COLL VENOUS BLD VENIPUNCTURE: CPT | Performed by: NURSE PRACTITIONER

## 2020-11-28 PROCEDURE — 83615 LACTATE (LD) (LDH) ENZYME: CPT | Performed by: NURSE PRACTITIONER

## 2020-11-28 PROCEDURE — 80202 ASSAY OF VANCOMYCIN: CPT | Performed by: INTERNAL MEDICINE

## 2020-11-28 PROCEDURE — 85025 COMPLETE CBC W/AUTO DIFF WBC: CPT | Performed by: NURSE PRACTITIONER

## 2020-11-28 PROCEDURE — 250N000011 HC RX IP 250 OP 636: Performed by: INTERNAL MEDICINE

## 2020-11-28 PROCEDURE — 85045 AUTOMATED RETICULOCYTE COUNT: CPT | Performed by: NURSE PRACTITIONER

## 2020-11-28 PROCEDURE — 36415 COLL VENOUS BLD VENIPUNCTURE: CPT | Performed by: INTERNAL MEDICINE

## 2020-11-28 PROCEDURE — 250N000013 HC RX MED GY IP 250 OP 250 PS 637: Performed by: NURSE PRACTITIONER

## 2020-11-28 PROCEDURE — 99233 SBSQ HOSP IP/OBS HIGH 50: CPT | Performed by: STUDENT IN AN ORGANIZED HEALTH CARE EDUCATION/TRAINING PROGRAM

## 2020-11-28 PROCEDURE — 258N000003 HC RX IP 258 OP 636: Performed by: NURSE PRACTITIONER

## 2020-11-28 PROCEDURE — 120N000002 HC R&B MED SURG/OB UMMC

## 2020-11-28 PROCEDURE — 258N000003 HC RX IP 258 OP 636

## 2020-11-28 RX ADMIN — RIVAROXABAN 10 MG: 10 TABLET, FILM COATED ORAL at 17:23

## 2020-11-28 RX ADMIN — PIPERACILLIN SODIUM AND TAZOBACTAM SODIUM 4.5 G: 4; .5 INJECTION, POWDER, LYOPHILIZED, FOR SOLUTION INTRAVENOUS at 10:20

## 2020-11-28 RX ADMIN — Medication 50 MCG: at 08:10

## 2020-11-28 RX ADMIN — DEXAMETHASONE 6 MG: 2 TABLET ORAL at 08:10

## 2020-11-28 RX ADMIN — PIPERACILLIN SODIUM AND TAZOBACTAM SODIUM 4.5 G: 4; .5 INJECTION, POWDER, LYOPHILIZED, FOR SOLUTION INTRAVENOUS at 17:23

## 2020-11-28 RX ADMIN — ZINC SULFATE 220 MG (50 MG) CAPSULE 220 MG: CAPSULE at 08:10

## 2020-11-28 RX ADMIN — PIPERACILLIN SODIUM AND TAZOBACTAM SODIUM 4.5 G: 4; .5 INJECTION, POWDER, LYOPHILIZED, FOR SOLUTION INTRAVENOUS at 23:48

## 2020-11-28 RX ADMIN — VANCOMYCIN HYDROCHLORIDE 1250 MG: 100 INJECTION, POWDER, LYOPHILIZED, FOR SOLUTION INTRAVENOUS at 12:05

## 2020-11-28 RX ADMIN — LIDOCAINE 1 PATCH: 560 PATCH PERCUTANEOUS; TOPICAL; TRANSDERMAL at 10:20

## 2020-11-28 RX ADMIN — REMDESIVIR 100 MG: 100 INJECTION, POWDER, LYOPHILIZED, FOR SOLUTION INTRAVENOUS at 18:27

## 2020-11-28 RX ADMIN — PIPERACILLIN SODIUM AND TAZOBACTAM SODIUM 4.5 G: 4; .5 INJECTION, POWDER, LYOPHILIZED, FOR SOLUTION INTRAVENOUS at 05:12

## 2020-11-28 ASSESSMENT — ACTIVITIES OF DAILY LIVING (ADL)
ADLS_ACUITY_SCORE: 16

## 2020-11-28 NOTE — PLAN OF CARE
"Rxg-cy-mjbxs progress note. Care from: 07:00 - 19:00     /78 (BP Location: Left arm)   Pulse 80   Temp 96.2  F (35.7  C) (Oral)   Resp 20   Ht 1.727 m (5' 8\")   Wt 59.9 kg (132 lb 1.6 oz)   SpO2 95%   BMI 20.09 kg/m         Vitals: VSS     Neuro: WDL   o Pain: Denies pain   o Mood/Behavior: Calm and cooperative     Activity: Pt up and ambulating x 1 this afternoon in room, had lunch in his chair     Cardiovascular: WDL     Respiratory: SpO2 stable at 95% on RA at rest, pt desaturates to around 90% with exertion    GI & Nutrition: Pt states no appetite, but he is forcing himself to eat. Pt eating 50-75% of meals  o Nausea: Denies   o Bowel Movement: Diarrhea has mostly resolved    : WDL     Skin, Wounds & LDAs: Skin intact, some bruising. Trace edema in lower extremities - now elevated while in bed - fluids discontinued overnight. PIV x 2 intact, saline locked     Events & Plan Updates: Planning for eventual discharge to TCU next week, continue to monitor breathing and activity tolerance   "

## 2020-11-28 NOTE — PHARMACY-VANCOMYCIN DOSING SERVICE
Pharmacy Vancomycin Note  Date of Service 2020  Patient's  1965   55 year old, male    Indication: Healthcare-Associated Pneumonia  Goal Trough Level: 15-20 mg/L  Day of Therapy: 2  Current Vancomycin regimen:  1250 mg IV q12h    Current estimated CrCl = Estimated Creatinine Clearance: 88.5 mL/min (based on SCr of 0.79 mg/dL).    Creatinine for last 3 days  2020: 11:19 AM Creatinine 0.69 mg/dL  2020:  5:56 AM Creatinine 0.71 mg/dL  2020:  7:04 AM Creatinine 0.79 mg/dL    Recent Vancomycin Levels (past 3 days)  2020: 11:10 AM Vancomycin Level 13.3 mg/L    Vancomycin IV Administrations (past 72 hours)                   vancomycin 1250 mg in 0.9% NaCl 250 mL intermittent infusion 1,250 mg (mg) 1,250 mg Given 20 2351     1,250 mg Given  1203     1,250 mg Given  0004                Nephrotoxins and other renal medications (From now, onward)    Start     Dose/Rate Route Frequency Ordered Stop    20 2300  piperacillin-tazobactam (ZOSYN) 4.5 g vial to attach to  mL bag      4.5 g  over 30 Minutes Intravenous EVERY 6 HOURS 20 2219      20 2300  vancomycin 1250 mg in 0.9% NaCl 250 mL intermittent infusion 1,250 mg      1,250 mg  over 90 Minutes Intravenous EVERY 12 HOURS 20 2241               Contrast Orders - past 72 hours (72h ago, onward)    Start     Dose/Rate Route Frequency Ordered Stop    20 1335  iopamidol (ISOVUE-370) solution 53 mL      53 mL Intravenous ONCE 20 1335 20 1357          Interpretation of levels and current regimen:  Trough level is  Subtherapeutic, but this was after 3 doses, vanco troughs likely to increase with subsequent doses as he achieves steady-state levels and CrCl rising some as well.    Has serum creatinine changed > 50% in last 72 hours: No    Renal Function: Stable possibly slightly rising  Plan:  1.  Continue Current Dose  2.  Pharmacy will check trough levels as appropriate in 1-3 Days.     3. Serum creatinine levels will be ordered daily for the first week of therapy and at least twice weekly for subsequent weeks.      Jemal ForemanD, [facility]    [phone]  Pager [phone]          .

## 2020-11-28 NOTE — PLAN OF CARE
"Care assumed: 1900-0730   Vitals  Pain  BP (!) 142/80   Pulse 71   Temp 97  F (36.1  C) (Oral)   Resp 18   Ht 1.727 m (5' 8\")   Wt 59.9 kg (132 lb 1.6 oz)   SpO2 95% 1.5LPM  BMI 20.09 kg/m      Denies Pain    Activity  SBA to commode    Neuro/Mood  A&Ox4. No numbness or tingling. Pleasant and calm    Cardiac  Unable to auscultate PAPR. Pt reports no signs of acute distress.    Respiratory  Unable to auscultate PAPR. Pt reports SOB during exertion and trouble with deep breaths. But does not report any acute respiratory distress MEENA    GI/  Voids spontaneously.   Diarrhea during previous shift. No BM for this shift    Diet/ Nutrition  Reg diet. Poor diet    Skin, Wounds, LDAs Skin intact.  Two PIV on either arm.   Left PIV infusing Abx and then TKO      Plan of Care  Other notes Continue to monitor and update MD with changes.   Pt O2 sats improving 98% on 1.5 L of nasal cannula.        "

## 2020-11-28 NOTE — PROVIDER NOTIFICATION
D/I  Critical Lab results  - Notified [employer] cover of patient's results,  + GM Stain - < 10 epithelial Cells, > 25 PMN's, Few gm - negative cells, Few gm + positive cells.

## 2020-11-28 NOTE — PROGRESS NOTES
[facility]     Medicine Progress Note - Hospitalist Service, Gold 9       Date of Admission:  11/26/2020  Assessment & Plan       Mr. Marrero is a 56 yo man with history of recent COVID-19 infection (11/15) and hospitalization from 11/23-11/24 who presented with worsening hypoxia, ongoing fevers, and found to be hyponatremic.     # Acute hypoxic respiratory failure   # COVID-19 infection  # Concern for bacterial pneumonia  - Continue dexamethasone 6mg x10 days  - Continue remdesivir x 5 days  - Started on vancomycin and zosyn given recent hospital exposure- MRSA swabs negative, stop vancomycin. Continue zosyn for now, cultures pending. Gram + and gram - cocci on gram stain.   - Follow up sputum and blood cultures  - Daily covid labs  - Continue xarelto  - Supplemental oxygen, wean as tolerated  - PT consult    # Acute on chronic hyponatremia  Na improved with fluids, less like SIADH. Suspect hypovolemic hyponatremia in the setting of diarrhea. Also, has had periodically low Na since 6/2020. Cortisol level normal.   - I/Os  - BMP in AM    # Diarrhea  Likely due to covid, c. Diff negative  - Imodium prn     # Poor oral intake  Patient reports several week of decreased intake. Denies appetite. He is worried about not eating and thinks it is making him weak.   - Nutrition consult    # Anemia  Hgb 11.3, no signs of bleeding.     # Back pain  Unclear etiology, perhaps myalgia and fevers. Improved with lidoderm patch.  - Tylenol prn and lidoderm patch prn      Diet: Advance Diet as Tolerated: Regular Diet Adult    DVT Prophylaxis: Rivaroxaban  Bautista Catheter: not present  Code Status: Full Code      Disposition Plan   Expected discharge: 2 - 3 days, recommended to transitional care unit once O2 use less than 2 liters/minute and safe disposition plan/ TCU bed available.  Entered: Dang Mena MD 11/28/2020, 4:26 PM     The patient's care was discussed with the Bedside Nurse  and Patient.    Dang Mena MD  Hospitalist Service, [address]   Contact information available via [facility] Paging/Directory  Please see sign in/sign out for up to date coverage information  ______________________________________________________________________    Interval History   Rony feels a little better today. He continues feel a short of breath, but it is better. He has a cough. He feels weak and tired. He continues to have poor appetite and is worried about not eating. His diarrhea improved with imodium.  His back pain is better with the lidoderm patch.    Data reviewed today: I reviewed all medications, new labs and imaging results over the last 24 hours. I personally reviewed no images or EKG's today.    Physical Exam   Vital Signs: Temp: 96.2  F (35.7  C) Temp src: Oral BP: 134/78 Pulse: 80   Resp: 20 SpO2: 95 % O2 Device: None (Room air) Oxygen Delivery: 1 LPM  Weight: 132 lbs 1.6 oz  General Appearance: Tired appearing man, NAD.  Respiratory: Coarse breath sounds bilaterally. No wheezing or crackles.  Cardiovascular: RRR. Normal S1/S2. No murmurs.   GI: Soft, non-tender, non-distended. Normoactive bowel sounds.   Skin: No rash or lesions.  Other: Alert and oriented x3. CNII-XII grossly intact.      Data   Recent Labs   Lab 11/28/20  0704 11/27/20  0556 11/27/20  0022 11/26/20  1119 11/26/20  1119 11/23/20  1234 11/23/20  1234   WBC 21.7* 12.8*  --   --  10.4   < > 5.2   HGB 11.6* 11.7*  --   --  11.3*   < > 12.8*   MCV 98 96  --   --  95   < > 96    276  --   --  214   < > 174   INR 1.59* 1.41*  --   --   --   --  1.02    134 133   < > 124*   < > 126*   POTASSIUM 3.5 3.4  --   --  3.7   < > 4.1   CHLORIDE 108 104  --   --  91*   < > 93*   CO2 21 21  --   --  26   < > 28   BUN 12 8  --   --  5*   < > 7   CR 0.79 0.71  --   --  0.69   < > 0.80   ANIONGAP 8 9  --   --  8   < > 5   SHAGGY 8.2* 8.3*  --   --  8.2*   < > 8.1*   * 119*   --   --  122*   < > 103*   ALBUMIN 2.2* 2.5*  --   --  2.7*  --  3.5   PROTTOTAL 6.2* 6.8  --   --  6.9  --  7.6   BILITOTAL 1.2 0.6  --   --  0.7  --  0.5   ALKPHOS 65 70  --   --  72  --  76   ALT 25 30  --   --  33  --  26   AST 36 46*  --   --  50*  --  35   LIPASE  --   --   --   --   --   --  192   TROPI  --   --   --   --  <0.015  --  <0.015    < > = values in this interval not displayed.

## 2020-11-29 LAB
ANION GAP SERPL CALCULATED.3IONS-SCNC: 9 MMOL/L (ref 3–14)
BACTERIA SPEC CULT: NORMAL
BASOPHILS # BLD AUTO: 0.1 10E9/L (ref 0–0.2)
BASOPHILS NFR BLD AUTO: 0.2 %
BUN SERPL-MCNC: 12 MG/DL (ref 7–30)
CALCIUM SERPL-MCNC: 8.1 MG/DL (ref 8.5–10.1)
CHLORIDE SERPL-SCNC: 107 MMOL/L (ref 94–109)
CO2 SERPL-SCNC: 20 MMOL/L (ref 20–32)
CREAT SERPL-MCNC: 0.77 MG/DL (ref 0.66–1.25)
CRP SERPL-MCNC: 58 MG/L (ref 0–8)
D DIMER PPP FEU-MCNC: 0.4 UG/ML FEU (ref 0–0.5)
DIFFERENTIAL METHOD BLD: ABNORMAL
EOSINOPHIL # BLD AUTO: 0 10E9/L (ref 0–0.7)
EOSINOPHIL NFR BLD AUTO: 0 %
ERYTHROCYTE [DISTWIDTH] IN BLOOD BY AUTOMATED COUNT: 12.3 % (ref 10–15)
FIBRINOGEN PPP-MCNC: 430 MG/DL (ref 200–420)
GFR SERPL CREATININE-BSD FRML MDRD: >90 ML/MIN/{1.73_M2}
GLUCOSE SERPL-MCNC: 150 MG/DL (ref 70–99)
HCT VFR BLD AUTO: 35 % (ref 40–53)
HGB BLD-MCNC: 11.4 G/DL (ref 13.3–17.7)
IMM GRANULOCYTES # BLD: 1 10E9/L (ref 0–0.4)
IMM GRANULOCYTES NFR BLD: 4.5 %
INR PPP: 1.75 (ref 0.86–1.14)
LDH SERPL L TO P-CCNC: 285 U/L (ref 85–227)
LYMPHOCYTES # BLD AUTO: 1 10E9/L (ref 0.8–5.3)
LYMPHOCYTES NFR BLD AUTO: 4.3 %
Lab: NORMAL
MAGNESIUM SERPL-MCNC: 2 MG/DL (ref 1.6–2.3)
MCH RBC QN AUTO: 33 PG (ref 26.5–33)
MCHC RBC AUTO-ENTMCNC: 32.6 G/DL (ref 31.5–36.5)
MCV RBC AUTO: 101 FL (ref 78–100)
MONOCYTES # BLD AUTO: 1.2 10E9/L (ref 0–1.3)
MONOCYTES NFR BLD AUTO: 5.4 %
NEUTROPHILS # BLD AUTO: 19.2 10E9/L (ref 1.6–8.3)
NEUTROPHILS NFR BLD AUTO: 85.6 %
NRBC # BLD AUTO: 0 10*3/UL
NRBC BLD AUTO-RTO: 0 /100
PLATELET # BLD AUTO: 337 10E9/L (ref 150–450)
POTASSIUM SERPL-SCNC: 3 MMOL/L (ref 3.4–5.3)
POTASSIUM SERPL-SCNC: 3.6 MMOL/L (ref 3.4–5.3)
RBC # BLD AUTO: 3.45 10E12/L (ref 4.4–5.9)
RETICS # AUTO: 28.6 10E9/L (ref 25–95)
RETICS/RBC NFR AUTO: 0.8 % (ref 0.5–2)
SODIUM SERPL-SCNC: 136 MMOL/L (ref 133–144)
SPECIMEN SOURCE: NORMAL
WBC # BLD AUTO: 22.5 10E9/L (ref 4–11)

## 2020-11-29 PROCEDURE — 36415 COLL VENOUS BLD VENIPUNCTURE: CPT | Performed by: STUDENT IN AN ORGANIZED HEALTH CARE EDUCATION/TRAINING PROGRAM

## 2020-11-29 PROCEDURE — 85379 FIBRIN DEGRADATION QUANT: CPT | Performed by: NURSE PRACTITIONER

## 2020-11-29 PROCEDURE — 258N000003 HC RX IP 258 OP 636: Performed by: NURSE PRACTITIONER

## 2020-11-29 PROCEDURE — 80048 BASIC METABOLIC PNL TOTAL CA: CPT | Performed by: NURSE PRACTITIONER

## 2020-11-29 PROCEDURE — 83735 ASSAY OF MAGNESIUM: CPT | Performed by: NURSE PRACTITIONER

## 2020-11-29 PROCEDURE — 120N000002 HC R&B MED SURG/OB UMMC

## 2020-11-29 PROCEDURE — 250N000013 HC RX MED GY IP 250 OP 250 PS 637: Performed by: NURSE PRACTITIONER

## 2020-11-29 PROCEDURE — 85025 COMPLETE CBC W/AUTO DIFF WBC: CPT | Performed by: NURSE PRACTITIONER

## 2020-11-29 PROCEDURE — 250N000012 HC RX MED GY IP 250 OP 636 PS 637: Performed by: NURSE PRACTITIONER

## 2020-11-29 PROCEDURE — 84132 ASSAY OF SERUM POTASSIUM: CPT | Performed by: STUDENT IN AN ORGANIZED HEALTH CARE EDUCATION/TRAINING PROGRAM

## 2020-11-29 PROCEDURE — 99232 SBSQ HOSP IP/OBS MODERATE 35: CPT | Performed by: STUDENT IN AN ORGANIZED HEALTH CARE EDUCATION/TRAINING PROGRAM

## 2020-11-29 PROCEDURE — 250N000013 HC RX MED GY IP 250 OP 250 PS 637: Performed by: STUDENT IN AN ORGANIZED HEALTH CARE EDUCATION/TRAINING PROGRAM

## 2020-11-29 PROCEDURE — 85384 FIBRINOGEN ACTIVITY: CPT | Performed by: NURSE PRACTITIONER

## 2020-11-29 PROCEDURE — 83615 LACTATE (LD) (LDH) ENZYME: CPT | Performed by: NURSE PRACTITIONER

## 2020-11-29 PROCEDURE — 250N000009 HC RX 250: Performed by: NURSE PRACTITIONER

## 2020-11-29 PROCEDURE — 36415 COLL VENOUS BLD VENIPUNCTURE: CPT | Performed by: NURSE PRACTITIONER

## 2020-11-29 PROCEDURE — 250N000011 HC RX IP 250 OP 636: Performed by: INTERNAL MEDICINE

## 2020-11-29 PROCEDURE — 86140 C-REACTIVE PROTEIN: CPT | Performed by: NURSE PRACTITIONER

## 2020-11-29 PROCEDURE — 85045 AUTOMATED RETICULOCYTE COUNT: CPT | Performed by: NURSE PRACTITIONER

## 2020-11-29 PROCEDURE — 85610 PROTHROMBIN TIME: CPT | Performed by: NURSE PRACTITIONER

## 2020-11-29 RX ORDER — LEVOFLOXACIN 750 MG/1
750 TABLET, FILM COATED ORAL DAILY
Status: DISCONTINUED | OUTPATIENT
Start: 2020-11-29 | End: 2020-11-30 | Stop reason: HOSPADM

## 2020-11-29 RX ORDER — POTASSIUM CHLORIDE 750 MG/1
40 TABLET, EXTENDED RELEASE ORAL ONCE
Status: COMPLETED | OUTPATIENT
Start: 2020-11-29 | End: 2020-11-29

## 2020-11-29 RX ADMIN — POTASSIUM CHLORIDE 40 MEQ: 750 TABLET, EXTENDED RELEASE ORAL at 09:25

## 2020-11-29 RX ADMIN — ACETAMINOPHEN 650 MG: 325 TABLET, FILM COATED ORAL at 00:39

## 2020-11-29 RX ADMIN — PIPERACILLIN SODIUM AND TAZOBACTAM SODIUM 4.5 G: 4; .5 INJECTION, POWDER, LYOPHILIZED, FOR SOLUTION INTRAVENOUS at 04:45

## 2020-11-29 RX ADMIN — LEVOFLOXACIN 750 MG: 750 TABLET, FILM COATED ORAL at 16:44

## 2020-11-29 RX ADMIN — PIPERACILLIN SODIUM AND TAZOBACTAM SODIUM 4.5 G: 4; .5 INJECTION, POWDER, LYOPHILIZED, FOR SOLUTION INTRAVENOUS at 10:31

## 2020-11-29 RX ADMIN — RIVAROXABAN 10 MG: 10 TABLET, FILM COATED ORAL at 16:44

## 2020-11-29 RX ADMIN — DEXAMETHASONE 6 MG: 2 TABLET ORAL at 07:44

## 2020-11-29 RX ADMIN — REMDESIVIR 100 MG: 100 INJECTION, POWDER, LYOPHILIZED, FOR SOLUTION INTRAVENOUS at 21:16

## 2020-11-29 RX ADMIN — ZINC SULFATE 220 MG (50 MG) CAPSULE 220 MG: CAPSULE at 07:44

## 2020-11-29 RX ADMIN — Medication 50 MCG: at 07:45

## 2020-11-29 ASSESSMENT — MIFFLIN-ST. JEOR: SCORE: 1455.87

## 2020-11-29 ASSESSMENT — ACTIVITIES OF DAILY LIVING (ADL)
ADLS_ACUITY_SCORE: 16

## 2020-11-29 NOTE — PROGRESS NOTES
CLINICAL NUTRITION SERVICES - ASSESSMENT NOTE     Nutrition Prescription    RECOMMENDATIONS FOR MDs/PROVIDERS TO ORDER:  Encourage PO intake     Malnutrition Status:    Unable to determine due to COVID precautions     Recommendations already ordered by Registered Dietitian (RD):  - Nutrition Education - Reviewed the importance of adequate intake, increased nutrient needs, and roll of RD. Encouraged pt to eat BID meals + BID snack.   - Snacks - order peanut butter sandwich on toasted ww bread as PM and HS snack   - Switch from Regular to Vegetarian Diet per pt preference     Future/Additional Recommendations:  - Continue to monitor PO intake and wt trends  - If PO intake declines further, consider ordering supplements   - If PO intake dramatically declined and nutrition support warranted, consider placing an NGT and starting the following regimen:   Nutren 1.5 @ 55 mL/hr to provide 1980 kcals (33 kcal/kg/day), 90 g PRO (1.5 g/kg/day), 1003 mL H2O, 232 g CHO and no fiber daily.        Patient was not seen in person d/t number of staff going into COVID+ or PUI rooms restricted to limit exposure. Information collected from chart and per phone conversation with Rony.     REASON FOR ASSESSMENT  Rony Pandya is a/an 55 year old male assessed by the dietitian for Provider Order - Poor oral intake for several weeks, patient worried about nutrition    NUTRITION HISTORY  PMH of recent COVID-19 infection (11/15) and hospitalization from 11/23-11/24 who presented with worsening hypoxia, ongoing fevers, and found to be hyponatremic.    Pt reports eating BID meals at home with breakfast usually including quinoa + greens salad and a lunch of brown rice, bean, and nuts. His appetite has been decreased over the last several weeks but he is not sure why. At home Rony follows a vegetarian diet (goes not eat eggs) and tries to consume limited to no added sugar. He does not have a sweet tooth and thinks that the average american diet  "contains way too much sugar.     CURRENT NUTRITION ORDERS  Diet: Advance Diet as tolerated - Regular  Intake/Tolerance:   - Per RN note from : Ate around 50% of courtesy meal. Turkey sandwich substituted for PB and J. Pt is vegetarian  - Per flowsheets, pt has consumed 50-75% of meals ordered.   - Rony does not like jelly and would prefer a scheduled snack of a peanut butter sandwich on toasted ww bread. Pt continue to have a poor appetite but is \"forcing\" himself to eat so he can stay strong. He is having some difficulty finding foods on the menu that are not so sweet to meet his preferences. Rony notes that even the beans and rice here taste too sweet to him.      Last BM: Per I/O - , watery and brown     LABS  Labs reviewed  - Na+: 136 (WNL, previously low this admission)  - K+: 3.0 (L)  - Albumin: 2.2 (L), CRP: 58.0 (H)  - Range of last 5 B-150  - MCV: 101 (H), MCH and MCHC: WNL    MEDICATIONS  Medications reviewed  Miralax, senna-docusate  Vit D3- 50 mcg  Zinc Sulfate     ANTHROPOMETRICS  Height: 172.7 cm (5' 8\")  Most Recent Weight: 59.9 kg (132 lb 1.6 oz)    IBW: 70 kg (86%)  BMI: 20.1 kg/m2 Normal BMI  Weight History: Limited wt history between Chart Review and Care Everywhere. Over the last 4 months, Rony has lost 6# (4%).   Wt Readings from Last 10 Encounters:   20 59.9 kg (132 lb 1.6 oz)   07/15/20 62.8 kg (138 lb 8 oz)   18 68.4 kg (150 lb 14.4 oz)     Dosing Weight: 60 kg (actual)     ASSESSED NUTRITION NEEDS  Estimated Energy Needs: 0731-1249 kcals/day (30 - 35 kcals/kg )  Justification: Repletion  Estimated Protein Needs: 72-90 grams protein/day (1.2 - 1.5 grams of pro/kg)  Justification: Increased needs d/t COVID+  Estimated Fluid Needs: 4654-4403 mL/day (25 - 30 mL/kg)   Justification: Maintenance    PHYSICAL FINDINGS  See malnutrition section below.    MALNUTRITION  % Intake: < 75% for >/= 1 month (non-severe)  % Weight Loss: Weight loss does not meet criteria, only 4% " over 4 months   Subcutaneous Fat Loss: Unable to assess  Muscle Loss: Unable to assess  Fluid Accumulation/Edema: None noted  Malnutrition Diagnosis: Unable to determine due to COVID precautions     NUTRITION DIAGNOSIS  Inadequate protein-energy intake related to decreased appetite with limited food preferences on the menu as evidenced by pt eating about 50% of BID meals since admission.       INTERVENTIONS  Implementation  See Nutrition Prescription box above for details.    Goals  Patient to consume % of nutritionally adequate meal trays TID, or the equivalent with supplements/snacks.     Monitoring/Evaluation  Progress toward goals will be monitored and evaluated per protocol.      Talisha Johnson RD, LD  Weekend pager [phone]

## 2020-11-29 NOTE — PLAN OF CARE
"Care assumed: 1900-0730   Vitals  Pain  BP (!) 148/76 (BP Location: Left arm)   Pulse 67   Temp 96.8  F (36  C) (Oral)   Resp 20   Ht 1.727 m (5' 8\")   Wt 59.9 kg (132 lb 1.6 oz)   SpO2 95% RA   BMI 20.09 kg/m         Pain in lower back. Tylenol given with relief    Pt reported one instant of brief mild chest pain. Pt did not have a repeat of chest pain after this one instance. Lucas Reyna paged and is aware.   Hypertensive    Activity  SBA to commode    Neuro/Mood  A&Ox4. No numbness or tingling. Pleasant and calm    Cardiac  Unable to auscultate PAPR. Pt reports no signs of acute distress.    Respiratory  Unable to auscultate PAPR. Pt reports SOB during exertion and trouble with deep breaths. But does not report any acute respiratory distress MEENA    GI/  Voids spontaneously.   Diarrhea during previous shift. No BM for this shift    Diet/ Nutrition  Reg diet. Ate around 50% of courtesy meal. Turkey sandwich substituted for PB and J. Pt is vegetarian    Skin, Wounds, LDAs Skin intact.  Two PIV on either arm.   Left PIV infusing Abx and then TKO       Plan of Care  Other notes Pt communicated frustrations due to miscommunications and feelings of untimely response to call light.   Feelings were acknowledged and discussed no intention of ignoring Pt. Methods were dicussed to improve communication and response time.   Pt reported itchy nose. When Pt itches nose it bleeds. MD was paged. Orders for nasal spray put in around 0646 11/29/20, Will relay to day nurse   Continue to monitor and update MD with changes.                  "

## 2020-11-29 NOTE — PROGRESS NOTES
[facility]     Medicine Progress Note - Hospitalist Service, Gold 9       Date of Admission:  11/26/2020  Assessment & Plan       Mr. Marrero is a 56 yo man with history of recent COVID-19 infection (11/15) and hospitalization from 11/23-11/24 who presented with worsening hypoxia, ongoing fevers, and found to be hyponatremic.     # Acute hypoxic respiratory failure   # COVID-19 infection  # Concern for bacterial pneumonia  - Continue dexamethasone 6mg x10 days  - Continue remdesivir x 5 days  - Started on vancomycin and zosyn given recent hospital exposure- MRSA swabs negative, stop vancomycin.   - Sputum culture shows normal hedy. Patient significantly improved. Will discontinue zosyn. Treat with levaquin to complete 7 day course of antibiotics.   - Recheck labs in AM after transition to oral abx  - Follow up sputum and blood cultures  - Continue xarelto  - Supplemental oxygen, wean as tolerated  - PT consult    # Acute on chronic hyponatremia, improved  Na improved with fluids, less like SIADH. Suspect hypovolemic hyponatremia in the setting of diarrhea. Also, has had periodically low Na since 6/2020. Cortisol level normal. Additionally, patient drinks a lot of water per his report.   - I/Os  - BMP in AM     # Diarrhea  Likely due to covid, c. Diff negative  - Imodium prn     # Hypokalemia  Due to poor oral intake and diarrhea. Replace with oral K.     # Poor oral intake  Patient reports several weeks of decreased intake. Does not like hospital food as he feels it has too much sugar.  - Nutrition consult    # Anemia  Hgb 11.3, no signs of bleeding.     # Back pain  Unclear etiology, perhaps myalgia and fevers. Improved with lidoderm patch.  - Tylenol prn and lidoderm patch prn      Diet: Snacks/Supplements Adult: Other; peanut butter sandwich on toasted ww bread as PM and HS snack; Between Meals  Vegetarian Diet    DVT Prophylaxis: Rivaroxaban  Bautista Catheter: not  present  Code Status: Full Code      Disposition Plan   Expected discharge: Tomorrow, recommended to transitional care unit or home (patient unsure he wants to go to TCU, but is also nervous to go home) once O2 use less than 2 liters/minute and safe disposition plan/ TCU bed available and antibiotic plan established.  Entered: Dang Mena MD 11/29/2020, 2:28 PM     The patient's care was discussed with the Bedside Nurse and Patient.    Dang Mena MD  Hospitalist Service, [address]   Contact information available via [facility] Paging/Directory  Please see sign in/sign out for up to date coverage information  ______________________________________________________________________    Interval History   Turi feels better today. He continues to cough. He also reports a poor appetite, though that is partially due to not liking the food. He does not think he wants to go to TCU but does not feel well enough to go home. He reports that his diarrhea is better. Denies abdominal pain, nausea, or vomiting.     Data reviewed today: I reviewed all medications, new labs and imaging results over the last 24 hours. I personally reviewed no images or EKG's today.    Physical Exam   Vital Signs: Temp: 97.2  F (36.2  C) Temp src: Oral BP: (!) 146/90 Pulse: 76   Resp: 20 SpO2: 90 % O2 Device: None (Room air)    Weight: 132 lbs 1.6 oz  General Appearance: Alert man, NAD.  Respiratory: Clear to auscultation bilaterally. No wheezing or crackles.  Cardiovascular: RRR. Normal S1/S2. No murmurs.   GI: Soft, non-tender, non-distended. Normoactive bowel sounds.   Skin: No rash or lesions.  Other: Alert and oriented x3. CNII-XII grossly intact.      Data   Recent Labs   Lab 11/29/20  1402 11/29/20  0714 11/28/20  0704 11/27/20  0556 11/26/20  1119 11/26/20  1119 11/23/20  1234 11/23/20  1234   WBC  --  22.5* 21.7* 12.8*  --  10.4   < > 5.2   HGB  --  11.4* 11.6* 11.7*  --  11.3*   < >  12.8*   MCV  --  101* 98 96  --  95   < > 96   PLT  --  337 305 276  --  214   < > 174   INR  --  1.75* 1.59* 1.41*  --   --   --  1.02   NA  --  136 137 134   < > 124*   < > 126*   POTASSIUM 3.6 3.0* 3.5 3.4  --  3.7   < > 4.1   CHLORIDE  --  107 108 104  --  91*   < > 93*   CO2  --  20 21 21  --  26   < > 28   BUN  --  12 12 8  --  5*   < > 7   CR  --  0.77 0.79 0.71  --  0.69   < > 0.80   ANIONGAP  --  9 8 9  --  8   < > 5   SHAGGY  --  8.1* 8.2* 8.3*  --  8.2*   < > 8.1*   GLC  --  150* 121* 119*  --  122*   < > 103*   ALBUMIN  --   --  2.2* 2.5*  --  2.7*  --  3.5   PROTTOTAL  --   --  6.2* 6.8  --  6.9  --  7.6   BILITOTAL  --   --  1.2 0.6  --  0.7  --  0.5   ALKPHOS  --   --  65 70  --  72  --  76   ALT  --   --  25 30  --  33  --  26   AST  --   --  36 46*  --  50*  --  35   LIPASE  --   --   --   --   --   --   --  192   TROPI  --   --   --   --   --  <0.015  --  <0.015    < > = values in this interval not displayed.

## 2020-11-29 NOTE — PROGRESS NOTES
"[id number] progress note. Care from: 07:00 - 19:00     BP (!) 149/87 (BP Location: Left arm)   Pulse 56   Temp 96.3  F (35.7  C)   Resp 20   Ht 1.727 m (5' 8\")   Wt 59.9 kg (132 lb 1.6 oz)   SpO2 97%   BMI 20.09 kg/m       BP (!) 165/86 (BP Location: Left arm)   Pulse 64   Temp 96.7  F (35.9  C) (Oral)   Resp 18   Ht 1.727 m (5' 8\")   Wt 59.9 kg (132 lb 1.6 oz)   SpO2 94%   BMI 20.09 kg/m        Vitals: VSS     Neuro: WDL   o Pain: Denies  o Mood/Behavior: WDL     Activity: Up with standby/1 assist to commode and chair     Cardiovascular: WDL ex mild hypertension     Respiratory: WDL ex mild cough and dyspnea on exertion. Pt stable on RA at rest. Coughing fits are worsened by activity     GI & Nutrition: Fair appetite this morning, finishing 50-75% of meals. Appetite improved from yesterday    o Nausea: Denies   o Bowel Movement: Last BM 11/28, diarrhea mostly resolved     : WDL     Skin, Wounds & LDAs: Skin intact. PIV x 2 intact, saline locked     Notable Labs: K+ 3.0 this AM, replaced per protocol    Events & Plan Updates: Continue to monitor/manage respiratory status, encourage PO intake and out of bed activity  "

## 2020-11-30 ENCOUNTER — APPOINTMENT (OUTPATIENT)
Dept: PHYSICAL THERAPY | Facility: CLINIC | Age: 55
End: 2020-11-30
Payer: COMMERCIAL

## 2020-11-30 ENCOUNTER — PATIENT OUTREACH (OUTPATIENT)
Dept: CARE COORDINATION | Facility: CLINIC | Age: 55
End: 2020-11-30

## 2020-11-30 VITALS
WEIGHT: 132.1 LBS | DIASTOLIC BLOOD PRESSURE: 86 MMHG | BODY MASS INDEX: 20.02 KG/M2 | OXYGEN SATURATION: 96 % | RESPIRATION RATE: 18 BRPM | TEMPERATURE: 98.5 F | HEIGHT: 68 IN | SYSTOLIC BLOOD PRESSURE: 161 MMHG | HEART RATE: 69 BPM

## 2020-11-30 LAB
ANION GAP SERPL CALCULATED.3IONS-SCNC: 7 MMOL/L (ref 3–14)
BASOPHILS # BLD AUTO: 0 10E9/L (ref 0–0.2)
BASOPHILS NFR BLD AUTO: 0 %
BUN SERPL-MCNC: 14 MG/DL (ref 7–30)
CALCIUM SERPL-MCNC: 8.1 MG/DL (ref 8.5–10.1)
CHLORIDE SERPL-SCNC: 104 MMOL/L (ref 94–109)
CO2 SERPL-SCNC: 25 MMOL/L (ref 20–32)
CREAT SERPL-MCNC: 0.69 MG/DL (ref 0.66–1.25)
CRP SERPL-MCNC: 38 MG/L (ref 0–8)
D DIMER PPP FEU-MCNC: 0.5 UG/ML FEU (ref 0–0.5)
DIFFERENTIAL METHOD BLD: ABNORMAL
EOSINOPHIL # BLD AUTO: 0 10E9/L (ref 0–0.7)
EOSINOPHIL NFR BLD AUTO: 0 %
ERYTHROCYTE [DISTWIDTH] IN BLOOD BY AUTOMATED COUNT: 12.1 % (ref 10–15)
FIBRINOGEN PPP-MCNC: 411 MG/DL (ref 200–420)
GFR SERPL CREATININE-BSD FRML MDRD: >90 ML/MIN/{1.73_M2}
GLUCOSE SERPL-MCNC: 93 MG/DL (ref 70–99)
HCT VFR BLD AUTO: 34 % (ref 40–53)
HGB BLD-MCNC: 11.3 G/DL (ref 13.3–17.7)
INR PPP: 1.53 (ref 0.86–1.14)
LYMPHOCYTES # BLD AUTO: 0.6 10E9/L (ref 0.8–5.3)
LYMPHOCYTES NFR BLD AUTO: 3.5 %
MAGNESIUM SERPL-MCNC: 1.9 MG/DL (ref 1.6–2.3)
MCH RBC QN AUTO: 32.5 PG (ref 26.5–33)
MCHC RBC AUTO-ENTMCNC: 33.2 G/DL (ref 31.5–36.5)
MCV RBC AUTO: 98 FL (ref 78–100)
MONOCYTES # BLD AUTO: 0.6 10E9/L (ref 0–1.3)
MONOCYTES NFR BLD AUTO: 3.5 %
MYELOCYTES # BLD: 0.3 10E9/L
MYELOCYTES NFR BLD MANUAL: 1.8 %
NEUTROPHILS # BLD AUTO: 15.9 10E9/L (ref 1.6–8.3)
NEUTROPHILS NFR BLD AUTO: 91.2 %
PLATELET # BLD AUTO: 375 10E9/L (ref 150–450)
PLATELET # BLD EST: ABNORMAL 10*3/UL
POTASSIUM SERPL-SCNC: 3.3 MMOL/L (ref 3.4–5.3)
POTASSIUM SERPL-SCNC: 3.9 MMOL/L (ref 3.4–5.3)
RBC # BLD AUTO: 3.48 10E12/L (ref 4.4–5.9)
RBC MORPH BLD: NORMAL
SODIUM SERPL-SCNC: 137 MMOL/L (ref 133–144)
WBC # BLD AUTO: 17.4 10E9/L (ref 4–11)

## 2020-11-30 PROCEDURE — 36415 COLL VENOUS BLD VENIPUNCTURE: CPT | Performed by: NURSE PRACTITIONER

## 2020-11-30 PROCEDURE — 85379 FIBRIN DEGRADATION QUANT: CPT | Performed by: NURSE PRACTITIONER

## 2020-11-30 PROCEDURE — 250N000013 HC RX MED GY IP 250 OP 250 PS 637: Performed by: NURSE PRACTITIONER

## 2020-11-30 PROCEDURE — 99239 HOSP IP/OBS DSCHRG MGMT >30: CPT | Performed by: STUDENT IN AN ORGANIZED HEALTH CARE EDUCATION/TRAINING PROGRAM

## 2020-11-30 PROCEDURE — 86140 C-REACTIVE PROTEIN: CPT | Performed by: NURSE PRACTITIONER

## 2020-11-30 PROCEDURE — 36415 COLL VENOUS BLD VENIPUNCTURE: CPT | Performed by: INTERNAL MEDICINE

## 2020-11-30 PROCEDURE — 80048 BASIC METABOLIC PNL TOTAL CA: CPT | Performed by: NURSE PRACTITIONER

## 2020-11-30 PROCEDURE — 250N000013 HC RX MED GY IP 250 OP 250 PS 637: Performed by: INTERNAL MEDICINE

## 2020-11-30 PROCEDURE — 250N000012 HC RX MED GY IP 250 OP 636 PS 637: Performed by: NURSE PRACTITIONER

## 2020-11-30 PROCEDURE — 84132 ASSAY OF SERUM POTASSIUM: CPT | Performed by: INTERNAL MEDICINE

## 2020-11-30 PROCEDURE — 83735 ASSAY OF MAGNESIUM: CPT | Performed by: NURSE PRACTITIONER

## 2020-11-30 PROCEDURE — 85384 FIBRINOGEN ACTIVITY: CPT | Performed by: NURSE PRACTITIONER

## 2020-11-30 PROCEDURE — 250N000013 HC RX MED GY IP 250 OP 250 PS 637: Performed by: STUDENT IN AN ORGANIZED HEALTH CARE EDUCATION/TRAINING PROGRAM

## 2020-11-30 PROCEDURE — 85025 COMPLETE CBC W/AUTO DIFF WBC: CPT | Performed by: NURSE PRACTITIONER

## 2020-11-30 PROCEDURE — 97530 THERAPEUTIC ACTIVITIES: CPT | Mod: GP

## 2020-11-30 PROCEDURE — 85610 PROTHROMBIN TIME: CPT | Performed by: NURSE PRACTITIONER

## 2020-11-30 RX ORDER — POTASSIUM CHLORIDE 1500 MG/1
20 TABLET, EXTENDED RELEASE ORAL DAILY
Qty: 7 TABLET | Refills: 0 | Status: SHIPPED | OUTPATIENT
Start: 2020-11-30

## 2020-11-30 RX ORDER — POTASSIUM CHLORIDE 750 MG/1
40 TABLET, EXTENDED RELEASE ORAL ONCE
Status: COMPLETED | OUTPATIENT
Start: 2020-11-30 | End: 2020-11-30

## 2020-11-30 RX ORDER — LEVOFLOXACIN 750 MG/1
750 TABLET, FILM COATED ORAL DAILY
Qty: 3 TABLET | Refills: 0 | Status: SHIPPED | OUTPATIENT
Start: 2020-12-01 | End: 2020-12-04

## 2020-11-30 RX ADMIN — Medication 50 MCG: at 09:10

## 2020-11-30 RX ADMIN — DEXAMETHASONE 6 MG: 2 TABLET ORAL at 09:10

## 2020-11-30 RX ADMIN — POTASSIUM CHLORIDE 40 MEQ: 750 TABLET, EXTENDED RELEASE ORAL at 09:10

## 2020-11-30 RX ADMIN — LEVOFLOXACIN 750 MG: 750 TABLET, FILM COATED ORAL at 09:11

## 2020-11-30 RX ADMIN — ZINC SULFATE 220 MG (50 MG) CAPSULE 220 MG: CAPSULE at 09:10

## 2020-11-30 ASSESSMENT — ACTIVITIES OF DAILY LIVING (ADL)
ADLS_ACUITY_SCORE: 16

## 2020-11-30 NOTE — PROGRESS NOTES
This writer took over pt's care around 1600. Pt has been alert and oriented X4, speaks English well. Pt had discharge order ready. discharge med was delivered to room. PIV removed. Pt was educated on how to use O2 tank. VSS on RA, denied any pain. Pt contacted ride 15 min ago, currently awaiting for transport to take pt to front lobby to meet ride.

## 2020-11-30 NOTE — PLAN OF CARE
"Care assumed: 1900-0730   Vitals  Pain  BP (!) 166/90 (BP Location: Left arm)   Pulse 59   Temp 98.1  F (36.7  C) (Oral)   Resp 18   Ht 1.727 m (5' 8\")   Wt (P) 65.1 kg (143 lb 9.6 oz)   SpO2 96% 1L   BMI (P) 21.83 kg/m      Denies pain    Activity  SBA to commode    Neuro/Mood  A&Ox4. No numbness or tingling. Pleasant and calm    Cardiac  Unable to auscultate PAPR. Pt reports no signs of acute distress.    Respiratory  Unable to auscultate PAPR. Pt reports SOB during exertion and trouble with deep breaths. But does not report any acute respiratory distress MEENA    GI/  Voids spontaneously.   2 BM this shift. Loose, small, brown   Diet/ Nutrition  Reg diet.    Skin, Wounds, LDAs Skin intact.  Two PIV on either arm.   SL       Plan of Care  Other notes Pt desat on RA to 88-89. 1L nasal cannula=95%   Continue to monitor and update MD with changes             "

## 2020-11-30 NOTE — PROGRESS NOTES
Care Management Follow Up    Length of Stay (days): 4    Expected Discharge Date: 11/30/20     Concerns to be Addressed: Home oxygen, home care  Patient plan of care discussed at interdisciplinary rounds: Yes    Anticipated Discharge Disposition: Home      Anticipated Discharge Services: Home care, DME  Anticipated Discharge DME: Home oxygen    Patient/family educated on Medicare website which has current facility and service quality ratings: Yes  Education Provided on the Discharge Plan: Yes   Patient/Family in Agreement with the Plan: Yes    Referrals Placed by CM/SW: Home oxygen, home care    Additional Information:  Per unit SW, patient is refusing TCU placement and prefers to discharge to home. Patient contacted via hospital phone to further discuss discharge planning. Patient is agreeable to home care services, choice offered, prefers referral sent to [facility] as his PCP is at the [facility], referral sent at this time, orders placed for RN/PT/OT. Home O2 walk test completed, patient is requiring 2L O2 w/activity, order and face to face documentation completed, awaiting confirmation and delivery from [facility]. Patient notes that his family will likely be able to give him a ride home, no additional discharge needs noted.     [facility] (RN/PT/OT)  Phone  [phone]  Fax  [phone]    [facility] (2L O2 w/activity)  Phone: [phone] Addendum:  Per PT, patient will need a walker issued at discharge, PT will obtain order from the primary provider.     Lois Mendez, RNCC, BSN    [facility]    Medicine Group  [address] [place] [id number]    [email or web address]    Office: [phone] Pager: [phone]  To contact the weekend RNCC, page [phone].

## 2020-11-30 NOTE — CONSULTS
"Care Management Initial Consult    General Information  Assessment completed with:  ,  Pt     Primary Care Provider verified and updated as needed:     Readmission within the last 30 days:   No        Advance Care Planning:     Not discussed       Communication Assessment  Patient's communication style: spoken language (English or Bilingual)    Hearing Difficulty or Deaf: no   Wear Glasses or Blind: no    Cognitive  Cognitive/Neuro/Behavioral: WDL                      Living Environment:   People in home: child(napoleon), adult;parent(s);significant other;grandchild([place])     Current living Arrangements: apartment      Able to return to prior arrangements:     Pt states his apartment is accessible with elevator; no stairs required (to building entrance or apartment unit). Pt states wife is home 24/7, and he also lives with 3 children who are 22yo or above. Pt asserts he would have family consistently available to assist him PRN.    Family/Social Support:  Care provided by:   Spouse Shannon and 3 children 22yo and above.      Description of Support System:        Pt states wife is home 24/7, who supports pt with household chores, cooking, shopping, and ADL's as needed. Pt also lives with 3 children who are 22yo or above. Pt asserts he would have family consistently available to assist him PRN.    Current Resources:   Skilled Home Care Services:  N/A  Community Resources:  N/A  Equipment currently used at home: none  Supplies currently used at home:  N/A    Employment/Financial:  Employment Status:     Unemployed     Financial Concerns:      Pt identified his unemployment as a financial concern. 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 Pt states he has been collecting money from unemployment, \"but it's not much\"; also, his unemployment is to discontinue in December. Pt's wife is also unemployed.       Lifestyle & Psychosocial Needs:        Socioeconomic History     Marital status:     " " Spouse name: Not on file     Number of children: Not on file     Years of education: Not on file     Highest education level: Not on file     Tobacco Use     Smoking status: Never Smoker     Smokeless tobacco: Never Used   Substance and Sexual Activity     Alcohol use: No     Drug use: No     Sexual activity: Yes     Partners: Female       Functional Status:  Prior to admission patient needed assistance:    Pt described himself as independent prior to admission; no previous needed assistance identified.          Mental Health Status:      No issues identified.    Chemical Dependency Status:       No issues identified.          Values/Beliefs:  Spiritual, Cultural Beliefs, Gnosticism Practices, Values that affect care:       Pt speaks English and [place]. Pt declined any further spiritual/cultural needs while in hospital.           Additional Information:  Pt medically ready for discharge today; has TCU recs.    SW met with pt via pt's room phone to introduce self, role, and to discuss discharge planning. MENA inquired about pt's understanding of discharge; Pt stated he recently spoke to MD who informed pt \"may\" be medically ready to discharge today, but will f/u with pt soon.    MENA inquired about pt's discharge plan while discussing TCU options. Pt refused TCU, stating \"I just want to go home\". Pt stated his mother had received PT and other services while at home, and pt would like to receive similar services. Pt verified he does not need to take stairs to access his apartment, and he consistently has family at home for support. MENA inquired if pt would reconsider, given his current recommendations from PT; pt still refused TCU.    MENA informed he would f/u with RNCC re: home services.     MENA relayed pt's requests to RNCC.    PRIYANKA Lambert, [facility]  [facility]   [facility]       "

## 2020-11-30 NOTE — SUMMARY OF CARE
Patient has been assessed for Home Oxygen needs. Oxygen readings:    *Pulse oximetry (SpO2) = 94% on room air at rest while awake.    Not applicable:  *SpO2 improved to 94% on 0liters/minute at rest.    *SpO2 = 86% on room air during activity/with exercise.    *SpO2 improved to 93% on 2liters/minute during activity/with exercise.

## 2020-11-30 NOTE — PLAN OF CARE
Assumed cares: 1003-2106    Events: discharge orders placed. Home O2 given and home walker ordered.   VS: VSS on RA. 2L O2 w/ ambulation.   Labs: K 3.3, replaced orally, redraw at 4pm.   IV: PIV saline locked. To be removed before discharge.     Neuro: A&Ox4  GI/: Voiding adequately. Loose BMs.   Nutrition: Pt has good appetite.   Skin: CDI.   Pain: Pt c/o intermittent pain in L chest w/ coughing. MD aware.  Activity: Pt up SBA.     Family to  pt and bring to apartment. Pt refused TCU placement.

## 2020-11-30 NOTE — PROGRESS NOTES
Oxygen Documentation:   I certify that this patient, Rony Pandya has been under my care (or a nurse practitioner or physican's assistant working with me). This is the face-to-face encounter for oxygen medical necessity.      Rony Pandya is now in a chronic stable state and continues to require supplemental oxygen. Patient has continued oxygen desaturation due to COVID-19.    Alternative treatment(s) tried or considered and deemed clinically infective for treatment of COVID-19 include steroids.  If portability is ordered, is the patient mobile within the home? yes    Patients who qualify for home O2 coverage under the CMS guidelines require ABG tests or O2 sat readings obtained closest to, but no earlier than 2 days prior to the discharge, as evidence of the need for home oxygen therapy. Testing must be performed while patient is in the chronic stable state. See notes for O2 sats.    Dang Mena MD

## 2020-11-30 NOTE — DISCHARGE SUMMARY
[facility]   Hospitalist Discharge Summary      Date of Admission:  11/26/2020  Date of Discharge:  11/30/2020  Discharging Provider: Dang Mena MD  Discharge Team: Hospitalist Service, Gold 9    Discharge Diagnoses   - Acute hypoxic respiratory failure due to COVID-19 infection  - Bacterial pneumonia  - Acute on chronic hyponatremia  - Hypokalemia   - Diarrhea  - Anemia  - Back pain  - Elevated blood pressure    Follow-ups Needed After Discharge   Follow-up Appointments     Adult [facility] Follow-up and recommended labs and tests      Follow up with primary care provider, Arlin Newman, within 3-5 days   for hospital follow- up.  The following labs/tests are recommended: BMP.      Appointments on [place] and/or [facility] (with [facility] or [facility]   provider or service). Call [phone] if you haven't heard regarding   these appointments within 7 days of discharge.           Unresulted Labs Ordered in the Past 30 Days of this Admission     Date and Time Order Name Status Description    11/26/2020 2130 Blood culture Preliminary     11/26/2020 2130 Blood culture Preliminary       These results will be followed up by Dr. Mena.    Discharge Disposition   Discharged to home  Condition at discharge: Stable    Hospital Course   Mr. Marrero is a 54 yo man with history of recent COVID-19 infection (11/15) and hospitalization from 11/23-11/24 who presented with worsening hypoxia, ongoing fevers, and found to be hyponatremic.     # Acute hypoxic respiratory failure   # COVID-19 infection  # Probable bacterial pneumonia  Patient initially required 1-2 L of oxygen, but was weaned to room air and 1-2 L with exertion prior to discharge. He was treated with dexamethasone and remdesivir while in the hospital. He was initially started on vancomycin and zosyn due to concern for bacterial pneumonia and was transitioned to levaquin 750mg daily prior to discharge. He was continued  on xarelto that was started on his previous admission for COVID. He was evaluated by PT who recommended TCU, but patient declined and preferred to go home. He will need follow up with PCP in 3-5 days.     # Acute on chronic hyponatremia, improved  This was likely hypovolemic hyponatremia in the setting of diarrhea. Also, has had periodically low Na since 6/2020. Cortisol level normal. Recommend repeat BMP and next PCP visit.    # Diarrhea, resolved  Patient had diarrhea on admission that improved throughout the hospitalization. C. Diff was negative. This was likely due to COVID.    # Poor oral intake  Patient has had poor oral intake while he has had COVID. He was eating better prior to discharge.     # Hypokalemia   Likely due to poor oral intake and diarrhea. Discharged on potassium 20 meq daily for one week.     # Anemia  Hgb 11.3, no signs of bleeding.     # Back pain  Unclear etiology, perhaps myalgia and fevers. Improved prior to discharge.     # Elevated blood pressure  Patient reports his BP is much better outside the hospital. Recommend follow up with PCP.     Consultations This Hospital Stay   PHYSICAL THERAPY ADULT IP CONSULT  PHARMACY TO DOSE VANC  OCCUPATIONAL THERAPY ADULT IP CONSULT  NUTRITION SERVICES ADULT IP CONSULT  CARE MANAGEMENT / SOCIAL WORK IP CONSULT    Code Status   Full Code    Time Spent on this Encounter   I, Dang Mena MD, personally saw the patient today and spent greater than 30 minutes discharging this patient.     Dang Mena MD  [facility] [address] [address]  Phone: [phone]  ______________________________________________________________________    Physical Exam   Vital Signs: Temp: 98.1  F (36.7  C) Temp src: Oral BP: (!) 166/90 Pulse: 59   Resp: 18 SpO2: 96 % O2 Device: None (Room air)    Weight: 132 lbs 1.6 oz  General Appearance: Alert, pleasant man, NAD.   Respiratory: CTAB. No wheezing or crackles. Breathing  comfortably on RA.  Cardiovascular: RRR. Normal S1/S2. No murmurs.   GI: Soft, non-tender, non-distended. Normoactive bowel sounds.   Skin: No rash or lesions.   Other: Alert and oriented x3. CNII-XII grossly intact.        Primary Care Physician   Arlin Newman    Discharge Orders      COVID-19 GetWell Loop Referral      Home care nursing referral      Home Care PT Referral for Hospital Discharge      Home Care OT Referral for Hospital Discharge      Reason for your hospital stay    You were in the hospital for COVID-19, bacterial pneumonia, and low sodium.     Contact provider    Contact your primary care provider if If increased trouble breathing, new arm/leg swelling, dizziness/passing out, falls, bleeding that doesn't stop, or uncontrolled pain.     Discharge - Quarantine/Isolation Instruction    Date of symptom onset:     Date of first positive test:    If you tested positive COVID-19 and show symptoms (fever, cough, body aches or trouble breathing):        Stay home and away from others (self-isolate) until:        At least 10 days have passed since your symptoms started. AND...        You've had no fever-and no medicine that reduces fever-for 3 full days (72 hours). AND...         Your other symptoms have resolved (gotten better).  If you tested positive for COVID-19 but don't show symptoms:       Stay home and away from others (self-isolate) until at least 10 days have passed since the date of your first positive COVID-19 test.     Activity    Your activity upon discharge: activity as tolerated     Contact provider    Is pulse ox less than 90%     Adult [facility] Follow-up and recommended labs and tests    Follow up with primary care provider, Arlin Newman, within 3-5 days for hospital follow- up.  The following labs/tests are recommended: BMP.      Appointments on [place] and/or [facility] (with [facility] or [facility] provider or service). Call [phone] if you haven't heard regarding these  appointments within 7 days of discharge.     MD face to face encounter    Documentation of Face to Face and Certification for Home Health Services    I certify that patient: Rony Pandya is under my care and that I, or a nurse practitioner or physician's assistant working with me, had a face-to-face encounter that meets the physician face-to-face encounter requirements with this patient on: 11/30/2020.    This encounter with the patient was in whole, or in part, for the following medical condition, which is the primary reason for home health care: COVID-19.    I certify that, based on my findings, the following services are medically necessary home health services: Nursing, Occupational Therapy and Physical Therapy.    My clinical findings support the need for the above services because: Nurse is needed: To assess vital signs, activity tolerance, overall wellbeing after changes in medications or other medical regimen, Occupational Therapy Services are needed to assess and treat cognitive ability and address ADL safety due to impairment in activity tolerance and Physical Therapy Services are needed to assess and treat the following functional impairments: independence, endurance.    Further, I certify that my clinical findings support that this patient is homebound (i.e. absences from home require considerable and taxing effort and are for medical reasons or Christianity services or infrequently or of short duration when for other reasons) because: Leaving home is medically contraindicated for the following reason(s): Infection risk / immunocompromised state where it is safer for them to receive services in the home.    Based on the above findings. I certify that this patient is confined to the home and needs intermittent skilled nursing care, physical therapy and/or speech therapy.  The patient is under my care, and I have initiated the establishment of the plan of care.  This patient will be followed by a physician  who will periodically review the plan of care.  Physician/Provider to provide follow up care: Arlin Newman    Attending hospital physician (the Medicare certified PECOS provider): Dr. Mena  Physician Signature: See electronic signature associated with these discharge orders.  Date: 11/30/2020     Oxygen Adult/Peds    Oxygen Documentation:   I certify that this patient, Rony Pandya has been under my care (or a nurse practitioner or physican's assistant working with me). This is the face-to-face encounter for oxygen medical necessity.      Rony Pandya is now in a chronic stable state and continues to require supplemental oxygen. Patient has continued oxygen desaturation due to COVID-19.    Alternative treatment(s) tried or considered and deemed clinically infective for treatment of COVID-19 include steroids.  If portability is ordered, is the patient mobile within the home? yes    Patients who qualify for home O2 coverage under the CMS guidelines require ABG tests or O2 sat readings obtained closest to, but no earlier than 2 days prior to the discharge, as evidence of the need for home oxygen therapy. Testing must be performed while patient is in the chronic stable state. See notes for O2 sats.     Diet    Follow this diet upon discharge: Regular diet       Significant Results and Procedures   Most Recent 3 CBC's:  Recent Labs   Lab Test 11/30/20  0532 11/29/20  0714 11/28/20  0704   WBC 17.4* 22.5* 21.7*   HGB 11.3* 11.4* 11.6*   MCV 98 101* 98    337 305     Most Recent 3 BMP's:  Recent Labs   Lab Test 11/30/20  0532 11/29/20  1402 11/29/20  0714 11/28/20  0704     --  136 137   POTASSIUM 3.3* 3.6 3.0* 3.5   CHLORIDE 104  --  107 108   CO2 25  --  20 21   BUN 14  --  12 12   CR 0.69  --  0.77 0.79   ANIONGAP 7  --  9 8   SHAGGY 8.1*  --  8.1* 8.2*   GLC 93  --  150* 121*       Discharge Medications   Current Discharge Medication List      START taking these medications    Details   levofloxacin  (LEVAQUIN) 750 MG tablet Take 1 tablet (750 mg) by mouth daily for 3 days  Qty: 3 tablet, Refills: 0    Associated Diagnoses: Pneumonia of left lower lobe due to infectious organism      potassium chloride ER (KLOR-CON M) 20 MEQ CR tablet Take 1 tablet (20 mEq) by mouth daily  Qty: 7 tablet, Refills: 0    Associated Diagnoses: Hypokalemia         CONTINUE these medications which have NOT CHANGED    Details   acetaminophen (TYLENOL) 325 MG tablet Take 2 tablets (650 mg) by mouth every 4 hours as needed for mild pain    Associated Diagnoses: 2019 novel coronavirus disease (COVID-19)      rivaroxaban ANTICOAGULANT (XARELTO) 10 MG TABS tablet Take 1 tablet (10 mg) by mouth daily (with dinner) 15 mg twice daily with food for 3 weeks, then 20 mg once daily with food  Qty: 30 tablet, Refills: 0    Comments: Future refills by primary care physician or cardiologist  Associated Diagnoses: 2019 novel coronavirus disease (COVID-19)      vitamin C w/VITALY HIPS 500 MG tablet Take 1 tablet (500 mg) by mouth 2 times daily  Qty: 60 tablet, Refills: 0    Associated Diagnoses: 2019 novel coronavirus disease (COVID-19)      vitamin D3 (CHOLECALCIFEROL) 50 mcg (2000 units) tablet Take 1 tablet (50 mcg) by mouth daily  Qty: 30 tablet, Refills: 0    Associated Diagnoses: 2019 novel coronavirus disease (COVID-19)      zinc gluconate 50 MG tablet Take 1 tablet (50 mg) by mouth daily  Qty: 30 tablet, Refills: 0    Associated Diagnoses: 2019 novel coronavirus disease (COVID-19)         STOP taking these medications       azithromycin (ZITHROMAX) 250 MG tablet Comments:   Reason for Stopping:         cefuroxime (CEFTIN) 500 MG tablet Comments:   Reason for Stopping:             Allergies   No Known Allergies

## 2020-12-01 NOTE — PLAN OF CARE
Physical Therapy Discharge Summary    Reason for therapy discharge:    Discharged to home.    Progress towards therapy goal(s). See goals on Care Plan in [facility] electronic health record for goal details.  Goals partially met.  Barriers to achieving goals:   discharge from facility.    Therapy recommendation(s):    Continued therapy is recommended.  Rationale/Recommendations:   PT to continue progressing strength and activity tolerance to PLOF.

## 2020-12-02 LAB
BACTERIA SPEC CULT: NO GROWTH
BACTERIA SPEC CULT: NO GROWTH
Lab: NORMAL
Lab: NORMAL
SPECIMEN SOURCE: NORMAL
SPECIMEN SOURCE: NORMAL

## 2020-12-02 NOTE — PROGRESS NOTES
Attempted to contact the patient x3 for post-hospital call without answer. Will close encounter at this time.    Brigette Mcfarland CMA, [facility]  Post Hospital Discharge Team

## 2020-12-03 ENCOUNTER — TELEPHONE (OUTPATIENT)
Dept: FAMILY MEDICINE | Facility: CLINIC | Age: 55
End: 2020-12-03

## 2020-12-03 NOTE — TELEPHONE ENCOUNTER
verbal ok for skilled home care orders Maria M Serna RN CM lrft on her secure vm  Felecia Brown RN   [facility]

## 2020-12-03 NOTE — TELEPHONE ENCOUNTER
Reason for call:  Order   Order or referral being requested: skill nursing  Reason for request: skill nursing  Date needed: as soon as possible  Has the patient been seen by the PCP for this problem? YES    Additional comments: skill nursing    1 a wk for 1 wk  2X a wk for 2 wks  1X a wk for 1 wk - 3 as needed    For:  disease education   respiratory assessment   vital signs      Phone number to reach patient:  Other phone number: [phone]    Best Time:  anytime    Can we leave a detailed message on this number?  YES    Travel screening: Not Applicable

## 2020-12-06 ENCOUNTER — TELEPHONE (OUTPATIENT)
Dept: FAMILY MEDICINE | Facility: CLINIC | Age: 55
End: 2020-12-06

## 2020-12-06 NOTE — TELEPHONE ENCOUNTER
"Saw pt today for nurse visit. Pt is under the impression he should not be on Xarelto and that \"the pharmacy is just trying to make money\". SN educated pt the importance of taking Xarelto as directed. No S/S clot at this time. Pt would like you to call or send message to him why he needs to be on it. Thank you!     Talisha Bennett, RN  [phone]  "

## 2020-12-07 NOTE — PROGRESS NOTES
"Rony Pandya is a 55 year old male who is being evaluated via a billable telephone visit.      The patient has been notified of following:     \"This telephone visit will be conducted via a call between you and your physician/provider. We have found that certain health care needs can be provided without the need for a physical exam.  This service lets us provide the care you need with a short phone conversation.  If a prescription is necessary we can send it directly to your pharmacy.  If lab work is needed we can place an order for that and you can then stop by our lab to have the test done at a later time.    Telephone visits are billed at different rates depending on your insurance coverage. During this emergency period, for some insurers they may be billed the same as an in-person visit.  Please reach out to your insurance provider with any questions.    If during the course of the call the physician/provider feels a telephone visit is not appropriate, you will not be charged for this service.\"    Patient has given verbal consent for Telephone visit?  Yes    What phone number would you like to be contacted at? [phone]    How would you like to obtain your AVS? Leela Cunningham MA      Subjective     Rony Pandya is a 55 year old male who presents via phone visit today for the following health issues:    [facility]       Hospital Follow-up Visit:    Hospital/Nursing Home/IP Rehab Facility: [facility]  Date of Admission: 11/26/2020  Date of Discharge: 11/30/2020  Reason(s) for Admission: Hyponatremia, COVID 19, SOB      Was your hospitalization related to COVID-19? YES   How are you feeling today? Better  In the past 24 hours have you had shortness of breath when speaking, walking, or climbing stairs? I don't have breathing problems  Do you have a cough? I don't have a cough  When is the last time you had a fever greater than 100? Stopped in the hospital   Are you having any " other symptoms? None   Do you have any other stressors you would like to discuss with your provider? No           PHQ Assesment Total Score(s) 12/8/2020   PHQ-2 Score 0   Some recent data might be hidden       Was the patient in the ICU or did the patient experience delirium during hospitalization?  No          Problems taking medications regularly:  None  Medication changes since discharge: Yes, he would like to know if he should continue to lary e his blood thinner medication.  Problems adhering to non-medication therapy:  None    Summary of hospitalization:  [place] hospital discharge summary reviewed  Diagnostic Tests/Treatments reviewed.  Follow up needed: BMP  Other Healthcare Providers Involved in Patient s Care:         Homecare  Update since discharge: improved.       Post Discharge Medication Reconciliation: discharge medications reconciled and changed, per note/orders.  Plan of care communicated with patient              Pt has been taking 1 tab of 10 mg Xarelto instead of 20 mg. No symptoms of clots or chest pain, still feeling a bit tired but nearly back to himself. Eating much better since discharge-did not like hospital food. Drinking water, no sob or dyspnea. Pt is very confused whether or not continued Xarelto is needed, past history perhaps of blood clot?     Taking potassium, hasn't had BMP yet        Review of Systems   Constitutional, HEENT, cardiovascular, pulmonary, GI, , musculoskeletal, neuro, skin, endocrine and psych systems are negative, except as otherwise noted.       Objective          Vitals:  No vitals were obtained today due to virtual visit.    healthy, alert and no distress  PSYCH: Alert and oriented times 3; coherent speech, normal   rate and volume, able to articulate logical thoughts, able   to abstract reason, no tangential thoughts, no hallucinations   or delusions  His affect is normal  RESP: No cough, no audible wheezing, able to talk in full sentences  Remainder of exam  unable to be completed due to telephone visits    No results found for this or any previous visit (from the past 24 hour(s)).        Assessment/Plan:    Assessment & Plan       ICD-10-CM    1. Pneumonia due to 2019 novel coronavirus  U07.1 rivaroxaban ANTICOAGULANT (XARELTO) 15 MG TABS tablet    J12.89    2. Hypokalemia  E87.6 **Basic metabolic panel FUTURE anytime   3. Hyponatremia  E87.1 **Basic metabolic panel FUTURE anytime   4. 2019 novel coronavirus disease (COVID-19)  U07.1 CARDIOLOGY EVAL ADULT REFERRAL     DISCONTINUED: rivaroxaban ANTICOAGULANT (XARELTO) 15 MG TABS tablet    doing well after COVID symptoms of pneumonia nearly resolved completely. Requests homecare to do lab work, unclear if has homecare open or if ended Friday, will have RN call to check if this is possible or let pt know if he does need to come in for a lab only appointment     Recommended follow up with Cardiology to get more history and consult regarding Xarelto plan post COVID        There are no Patient Instructions on file for this visit.    Return in about 1 day (around 12/9/2020) for Lab Work.    CRISTIAN Roth [facility]    Phone call duration:  13 minutes

## 2020-12-08 ENCOUNTER — TELEPHONE (OUTPATIENT)
Dept: FAMILY MEDICINE | Facility: CLINIC | Age: 55
End: 2020-12-08

## 2020-12-08 ENCOUNTER — VIRTUAL VISIT (OUTPATIENT)
Dept: FAMILY MEDICINE | Facility: CLINIC | Age: 55
End: 2020-12-08
Payer: COMMERCIAL

## 2020-12-08 DIAGNOSIS — U07.1 PNEUMONIA DUE TO 2019 NOVEL CORONAVIRUS: ICD-10-CM

## 2020-12-08 DIAGNOSIS — U07.1 2019 NOVEL CORONAVIRUS DISEASE (COVID-19): ICD-10-CM

## 2020-12-08 DIAGNOSIS — E87.1 HYPONATREMIA: ICD-10-CM

## 2020-12-08 DIAGNOSIS — U07.1 2019 NOVEL CORONAVIRUS DISEASE (COVID-19): Primary | ICD-10-CM

## 2020-12-08 DIAGNOSIS — U07.1 PNEUMONIA DUE TO 2019 NOVEL CORONAVIRUS: Primary | ICD-10-CM

## 2020-12-08 DIAGNOSIS — J12.82 PNEUMONIA DUE TO 2019 NOVEL CORONAVIRUS: Primary | ICD-10-CM

## 2020-12-08 DIAGNOSIS — J12.82 PNEUMONIA DUE TO 2019 NOVEL CORONAVIRUS: ICD-10-CM

## 2020-12-08 DIAGNOSIS — E87.6 HYPOKALEMIA: ICD-10-CM

## 2020-12-08 PROCEDURE — 99495 TRANSJ CARE MGMT MOD F2F 14D: CPT | Mod: 95 | Performed by: NURSE PRACTITIONER

## 2020-12-08 NOTE — Clinical Note
Please see if his homecare nurse can go draw his BMP in his home, he is recovering from COVID pneumoni aand doesn't feel up to coming in to lab yet. Thanks!

## 2020-12-08 NOTE — TELEPHONE ENCOUNTER
Arlin, pharmacy calling for clarification of refill, pharmacy has received different dosages and refills.     Will need specific dosing and directions.     11/23 ED visit   rivaroxaban ANTICOAGULANT (XARELTO) 10 MG TABS tablet Take 1 tablet (10 mg) by mouth daily (with dinner) 15 mg twice daily with food for 3 weeks, then 20 mg once daily with food  Qty: 30 tablet, Refills: 0     Comments: Future refills by primary care physician or cardiologist       Ariela Sutton RN   [facility]

## 2020-12-08 NOTE — TELEPHONE ENCOUNTER
Patient had virtual visit with provider today    Grace Sousa RN   [facility]

## 2020-12-08 NOTE — TELEPHONE ENCOUNTER
Spoke with Maria M BELTRAN case manager from [facility] home care:     Gave verbal order for BMP    Will go out tomorrow morning to draw. Will fax order for signature.    Grace Sousa RN   [facility]

## 2020-12-08 NOTE — TELEPHONE ENCOUNTER
Arlin Newman, APRN CNP  [address] Triage Pod A             Please see if his homecare nurse can go draw his BMP in his home, he is recovering from COVID pneumoni aand doesn't feel up to coming in to lab yet. Thanks!

## 2020-12-08 NOTE — TELEPHONE ENCOUNTER
I apologize I thought they were increasing by 5 per day and instead they are decreasing by 10 per day. Thank you for clarifying this.     We will continue the 10 mg per day he is on, and he can discuss with Cardiology whether or not he needs to continue after their appointment. Sorry about the confusion!     (I did discuss with pt that I am not the expert on post COVID Cardiac recommendations--hence the referral and bridge of this).     Thanks,   Arlin FOSTER CNP

## 2020-12-08 NOTE — TELEPHONE ENCOUNTER
I believe pt is to have COVID follow up correct? I see he had COVID and I haven't seen him-virtual   See below for reference:    Date of Admission:                    11/23/2020  Date of Discharge:                     No discharge date for patient encounter.  Admitting Physician:                  César Hu MD  Discharge Physician:                  Chris Helms MD  Discharging Service:                  Internal Medicine     Primary Provider: Arlin Newman                    Discharge Diagnosis:       Primary Discharge Diagnosis:     # Covid -19 infection: Positive on 11/15. .  # Left lower lobe pneumonia, from COVID-19 infection.   # Hyponatremia: 126 on admission.  Improved to 132 after initial fluid resuscitation.

## 2020-12-08 NOTE — PATIENT INSTRUCTIONS
Schedule with Cardiology soon to discuss Xarelto, the goal was to get you up to 20 mg, so first will increase to 15 mg and then if they agree would increase to 20 mg.     Consider COVID loop    I will have the nurses see if they can scheduel the blood draw in your home.

## 2020-12-08 NOTE — TELEPHONE ENCOUNTER
The pharmacy is calling with a question regarding a medication ordered by Arlin Newman NP.   The patient is Not Waiting.

## 2020-12-09 ENCOUNTER — TELEPHONE (OUTPATIENT)
Dept: FAMILY MEDICINE | Facility: CLINIC | Age: 55
End: 2020-12-09

## 2020-12-09 NOTE — TELEPHONE ENCOUNTER
Reason for call:  Other   Patient called regarding (reason for call): Verbal Orders  Additional comments: [facility] calling to request orders to continue PT. Initial visit was yesterday 12/8. They would like orders for PT once a week for two weeks for strength, balance and gait training. Please call [phone] with verbal orders. okto    Phone number to reach patient:  Home number on file [phone] (home)    Best Time:  n/a    Can we leave a detailed message on this number?  YES    Travel screening: Not Applicable

## 2020-12-10 ENCOUNTER — MEDICAL CORRESPONDENCE (OUTPATIENT)
Dept: HEALTH INFORMATION MANAGEMENT | Facility: CLINIC | Age: 55
End: 2020-12-10

## 2020-12-10 LAB
ANION GAP SERPL CALCULATED.3IONS-SCNC: 6 MMOL/L (ref 3–14)
BUN SERPL-MCNC: 12 MG/DL (ref 7–30)
CALCIUM SERPL-MCNC: 8.7 MG/DL (ref 8.5–10.1)
CHLORIDE SERPL-SCNC: 101 MMOL/L (ref 94–109)
CO2 SERPL-SCNC: 27 MMOL/L (ref 20–32)
CREAT SERPL-MCNC: 0.77 MG/DL (ref 0.66–1.25)
GFR SERPL CREATININE-BSD FRML MDRD: >90 ML/MIN/{1.73_M2}
GLUCOSE SERPL-MCNC: 108 MG/DL (ref 70–99)
POTASSIUM SERPL-SCNC: 3.7 MMOL/L (ref 3.4–5.3)
SODIUM SERPL-SCNC: 134 MMOL/L (ref 133–144)

## 2020-12-10 PROCEDURE — 80048 BASIC METABOLIC PNL TOTAL CA: CPT | Performed by: NURSE PRACTITIONER

## 2020-12-14 ENCOUNTER — HEALTH MAINTENANCE LETTER (OUTPATIENT)
Age: 55
End: 2020-12-14

## 2021-01-04 DIAGNOSIS — Z53.9 DIAGNOSIS NOT YET DEFINED: Primary | ICD-10-CM

## 2021-01-04 PROCEDURE — G0180 MD CERTIFICATION HHA PATIENT: HCPCS | Performed by: NURSE PRACTITIONER

## 2021-01-13 ENCOUNTER — TELEPHONE (OUTPATIENT)
Dept: FAMILY MEDICINE | Facility: CLINIC | Age: 56
End: 2021-01-13

## 2021-01-13 NOTE — TELEPHONE ENCOUNTER
Forms completed and faxed back to  [facility] @ [phone]. Placed into [employer] to scan into patients chart.   Odin Thomason MA

## 2021-01-25 DIAGNOSIS — Z53.9 DIAGNOSIS NOT YET DEFINED: Primary | ICD-10-CM

## 2021-01-25 PROCEDURE — G0180 MD CERTIFICATION HHA PATIENT: HCPCS | Performed by: NURSE PRACTITIONER

## 2021-10-02 ENCOUNTER — HEALTH MAINTENANCE LETTER (OUTPATIENT)
Age: 56
End: 2021-10-02

## 2022-01-17 ENCOUNTER — TELEPHONE (OUTPATIENT)
Dept: FAMILY MEDICINE | Facility: CLINIC | Age: 57
End: 2022-01-17
Payer: COMMERCIAL

## 2022-01-17 NOTE — TELEPHONE ENCOUNTER
Reason for Call:  Form, our goal is to have forms completed with 72 hours, however, some forms may require a visit or additional information.    Type of letter, form or note:  Home Oxygen Prescription     Who is the form from?: [facility] [employer] [facility] (if other please explain)    Where did the form come from: form was faxed in    What clinic location was the form placed at?: [facility]    Where the form was placed: [place]  Box/Folder    What number is listed as a contact on the form?:   [phone]  Fax:[phone]       Additional comments: Please Review and Sign     Call taken on 1/17/2022 at 4:16 PM by Kira Gambino MA

## 2022-01-17 NOTE — TELEPHONE ENCOUNTER
Please triage this request with , it pt under care of a specialist perhaps this should go to instead of to his Primary Care Provider?    Appears pt needs a follow up visit, or perhaps a post covid clinic referral if this is from November and he is still needing O2?    Thanks,   Arlin FOSTER CNP

## 2022-01-18 NOTE — TELEPHONE ENCOUNTER
Attempted to call with  and left vm asking pt to please call clinic and ask to speak with a nurse.       After reviewing patient chart found that patient was in hospital for COVID pneumonia Nov of 2020 and has not seen Arlin since Jan 2021. Patient really needs to do a follow up visit and be seen by specialist like pulmonology or post COVID clinic if he truly is still needing oxygen.     Thanks,  RUBY Silveira  [facility]

## 2022-01-19 NOTE — TELEPHONE ENCOUNTER
Spoke with [facility] medical equip staff member. They stated they were able to get a hold of patient on 1/11/22 and patient stated he is no longer using the oxygen so F medical equipment will be going out next week to  supplies from patient's home. Now closing this encounter.     Thanks,  Jordana RN  [facility]

## 2022-02-03 ENCOUNTER — MEDICAL CORRESPONDENCE (OUTPATIENT)
Dept: HEALTH INFORMATION MANAGEMENT | Facility: CLINIC | Age: 57
End: 2022-02-03
Payer: COMMERCIAL

## 2022-09-03 ENCOUNTER — HEALTH MAINTENANCE LETTER (OUTPATIENT)
Age: 57
End: 2022-09-03

## 2023-01-15 ENCOUNTER — HEALTH MAINTENANCE LETTER (OUTPATIENT)
Age: 58
End: 2023-01-15

## 2024-02-17 ENCOUNTER — HEALTH MAINTENANCE LETTER (OUTPATIENT)
Age: 59
End: 2024-02-17